# Patient Record
Sex: MALE | Race: WHITE | Employment: FULL TIME | URBAN - METROPOLITAN AREA
[De-identification: names, ages, dates, MRNs, and addresses within clinical notes are randomized per-mention and may not be internally consistent; named-entity substitution may affect disease eponyms.]

---

## 2018-01-16 ENCOUNTER — APPOINTMENT (OUTPATIENT)
Dept: GENERAL RADIOLOGY | Age: 68
End: 2018-01-16
Attending: INTERNAL MEDICINE
Payer: COMMERCIAL

## 2018-01-16 ENCOUNTER — HOSPITAL ENCOUNTER (EMERGENCY)
Age: 68
Discharge: HOME | End: 2018-01-16
Attending: INTERNAL MEDICINE
Payer: COMMERCIAL

## 2018-01-16 VITALS
HEART RATE: 67 BPM | DIASTOLIC BLOOD PRESSURE: 69 MMHG | WEIGHT: 195 LBS | OXYGEN SATURATION: 98 % | BODY MASS INDEX: 27.92 KG/M2 | TEMPERATURE: 97.9 F | HEIGHT: 70 IN | RESPIRATION RATE: 14 BRPM | SYSTOLIC BLOOD PRESSURE: 129 MMHG

## 2018-01-16 DIAGNOSIS — R07.9 EXERTIONAL CHEST PAIN: Primary | ICD-10-CM

## 2018-01-16 LAB
ALBUMIN SERPL-MCNC: 4.6 G/DL (ref 3.4–5)
ALBUMIN/GLOB SERPL: 1.5 {RATIO} (ref 0.8–1.7)
ALP SERPL-CCNC: 97 U/L (ref 45–117)
ALT SERPL-CCNC: 30 U/L (ref 16–61)
ANION GAP SERPL CALC-SCNC: 10 MMOL/L (ref 3–18)
APPEARANCE UR: ABNORMAL
APTT PPP: 27.5 SEC (ref 23–36.4)
AST SERPL-CCNC: 23 U/L (ref 15–37)
ATRIAL RATE: 83 BPM
BASOPHILS # BLD: 0.1 K/UL (ref 0–0.06)
BASOPHILS NFR BLD: 1 % (ref 0–2)
BILIRUB SERPL-MCNC: 0.4 MG/DL (ref 0.2–1)
BILIRUB UR QL: NEGATIVE
BUN SERPL-MCNC: 16 MG/DL (ref 7–18)
BUN/CREAT SERPL: 13 (ref 12–20)
CALCIUM SERPL-MCNC: 9.1 MG/DL (ref 8.5–10.1)
CALCULATED P AXIS, ECG09: 43 DEGREES
CALCULATED R AXIS, ECG10: -9 DEGREES
CALCULATED T AXIS, ECG11: 60 DEGREES
CHLORIDE SERPL-SCNC: 107 MMOL/L (ref 100–108)
CK MB CFR SERPL CALC: 0.7 % (ref 0–4)
CK MB CFR SERPL CALC: 0.8 % (ref 0–4)
CK MB SERPL-MCNC: 1 NG/ML (ref 5–25)
CK MB SERPL-MCNC: 1.2 NG/ML (ref 5–25)
CK SERPL-CCNC: 130 U/L (ref 39–308)
CK SERPL-CCNC: 167 U/L (ref 39–308)
CO2 SERPL-SCNC: 28 MMOL/L (ref 21–32)
COLOR UR: YELLOW
CREAT SERPL-MCNC: 1.27 MG/DL (ref 0.6–1.3)
DIAGNOSIS, 93000: NORMAL
DIFFERENTIAL METHOD BLD: ABNORMAL
EOSINOPHIL # BLD: 0.2 K/UL (ref 0–0.4)
EOSINOPHIL NFR BLD: 2 % (ref 0–5)
ERYTHROCYTE [DISTWIDTH] IN BLOOD BY AUTOMATED COUNT: 13.8 % (ref 11.6–14.5)
GLOBULIN SER CALC-MCNC: 3.1 G/DL (ref 2–4)
GLUCOSE SERPL-MCNC: 143 MG/DL (ref 74–99)
GLUCOSE UR STRIP.AUTO-MCNC: NEGATIVE MG/DL
HCT VFR BLD AUTO: 44.7 % (ref 36–48)
HGB BLD-MCNC: 14.9 G/DL (ref 13–16)
HGB UR QL STRIP: NEGATIVE
INR PPP: 0.9 (ref 0.8–1.2)
KETONES UR QL STRIP.AUTO: ABNORMAL MG/DL
LEUKOCYTE ESTERASE UR QL STRIP.AUTO: NEGATIVE
LYMPHOCYTES # BLD: 0.9 K/UL (ref 0.9–3.6)
LYMPHOCYTES NFR BLD: 9 % (ref 21–52)
MAGNESIUM SERPL-MCNC: 1.9 MG/DL (ref 1.6–2.6)
MCH RBC QN AUTO: 30.3 PG (ref 24–34)
MCHC RBC AUTO-ENTMCNC: 33.3 G/DL (ref 31–37)
MCV RBC AUTO: 91 FL (ref 74–97)
MONOCYTES # BLD: 0.8 K/UL (ref 0.05–1.2)
MONOCYTES NFR BLD: 8 % (ref 3–10)
NEUTS SEG # BLD: 8.2 K/UL (ref 1.8–8)
NEUTS SEG NFR BLD: 80 % (ref 40–73)
NITRITE UR QL STRIP.AUTO: NEGATIVE
P-R INTERVAL, ECG05: 170 MS
PH UR STRIP: 5.5 [PH] (ref 5–8)
PLATELET # BLD AUTO: 205 K/UL (ref 135–420)
PMV BLD AUTO: 9.4 FL (ref 9.2–11.8)
POTASSIUM SERPL-SCNC: 4.1 MMOL/L (ref 3.5–5.5)
PROT SERPL-MCNC: 7.7 G/DL (ref 6.4–8.2)
PROT UR STRIP-MCNC: NEGATIVE MG/DL
PROTHROMBIN TIME: 11.2 SEC (ref 11.5–15.2)
Q-T INTERVAL, ECG07: 358 MS
QRS DURATION, ECG06: 90 MS
QTC CALCULATION (BEZET), ECG08: 420 MS
RBC # BLD AUTO: 4.91 M/UL (ref 4.7–5.5)
SODIUM SERPL-SCNC: 145 MMOL/L (ref 136–145)
SP GR UR REFRACTOMETRY: 1.03 (ref 1–1.03)
TROPONIN I BLD-MCNC: <0.04 NG/ML (ref 0–0.08)
TROPONIN I SERPL-MCNC: <0.02 NG/ML (ref 0–0.06)
TROPONIN I SERPL-MCNC: <0.02 NG/ML (ref 0–0.06)
UROBILINOGEN UR QL STRIP.AUTO: 1 EU/DL (ref 0.2–1)
VENTRICULAR RATE, ECG03: 83 BPM
WBC # BLD AUTO: 10.2 K/UL (ref 4.6–13.2)

## 2018-01-16 PROCEDURE — 85025 COMPLETE CBC W/AUTO DIFF WBC: CPT | Performed by: INTERNAL MEDICINE

## 2018-01-16 PROCEDURE — 84484 ASSAY OF TROPONIN QUANT: CPT | Performed by: INTERNAL MEDICINE

## 2018-01-16 PROCEDURE — 83735 ASSAY OF MAGNESIUM: CPT | Performed by: INTERNAL MEDICINE

## 2018-01-16 PROCEDURE — 71045 X-RAY EXAM CHEST 1 VIEW: CPT

## 2018-01-16 PROCEDURE — 74011250637 HC RX REV CODE- 250/637: Performed by: INTERNAL MEDICINE

## 2018-01-16 PROCEDURE — 99285 EMERGENCY DEPT VISIT HI MDM: CPT

## 2018-01-16 PROCEDURE — 85610 PROTHROMBIN TIME: CPT | Performed by: INTERNAL MEDICINE

## 2018-01-16 PROCEDURE — 93005 ELECTROCARDIOGRAM TRACING: CPT

## 2018-01-16 PROCEDURE — 81003 URINALYSIS AUTO W/O SCOPE: CPT | Performed by: INTERNAL MEDICINE

## 2018-01-16 PROCEDURE — 85730 THROMBOPLASTIN TIME PARTIAL: CPT | Performed by: INTERNAL MEDICINE

## 2018-01-16 PROCEDURE — 82550 ASSAY OF CK (CPK): CPT | Performed by: INTERNAL MEDICINE

## 2018-01-16 PROCEDURE — 80053 COMPREHEN METABOLIC PANEL: CPT | Performed by: INTERNAL MEDICINE

## 2018-01-16 RX ORDER — FAMOTIDINE 20 MG/1
20 TABLET, FILM COATED ORAL 2 TIMES DAILY
Qty: 20 TAB | Refills: 0 | Status: SHIPPED | OUTPATIENT
Start: 2018-01-16 | End: 2018-01-26

## 2018-01-16 RX ORDER — NITROGLYCERIN 0.4 MG/1
0.4 TABLET SUBLINGUAL
Status: COMPLETED | OUTPATIENT
Start: 2018-01-16 | End: 2018-01-16

## 2018-01-16 RX ORDER — GUAIFENESIN 100 MG/5ML
324 LIQUID (ML) ORAL
Status: COMPLETED | OUTPATIENT
Start: 2018-01-16 | End: 2018-01-16

## 2018-01-16 RX ORDER — GUAIFENESIN 100 MG/5ML
81 LIQUID (ML) ORAL DAILY
Qty: 15 TAB | Refills: 0 | Status: SHIPPED | OUTPATIENT
Start: 2018-01-16 | End: 2019-11-26

## 2018-01-16 RX ADMIN — ASPIRIN 81 MG 324 MG: 81 TABLET ORAL at 20:38

## 2018-01-16 RX ADMIN — NITROGLYCERIN 0.4 MG: 0.4 TABLET SUBLINGUAL at 20:33

## 2018-01-17 LAB
ATRIAL RATE: 71 BPM
CALCULATED P AXIS, ECG09: 42 DEGREES
CALCULATED R AXIS, ECG10: -9 DEGREES
CALCULATED T AXIS, ECG11: 44 DEGREES
DIAGNOSIS, 93000: NORMAL
P-R INTERVAL, ECG05: 174 MS
Q-T INTERVAL, ECG07: 384 MS
QRS DURATION, ECG06: 96 MS
QTC CALCULATION (BEZET), ECG08: 417 MS
VENTRICULAR RATE, ECG03: 71 BPM

## 2018-01-17 NOTE — ED PROVIDER NOTES
EMERGENCY DEPARTMENT HISTORY AND PHYSICAL EXAM    Date: 1/16/2018  Patient Name: Fredy Reynoso    History of Presenting Illness     Chief Complaint   Patient presents with    Chest Pain         History Provided By: Patient    Chief Complaint: Chest pain  Duration: 1 hour  Timing:  Acute  Location: Bilateral  Quality: Radiating  Severity: 3 out of 10  Modifying Factors: Worsened with exertion, improves when sitting still. Associated Symptoms: lightheadedness, right neck pain, and nausea (resolved)    Additional History (Context):   8:09 PM  Fredy Reynoso is a 74 y.o. male with PMHX BPH, low testosterone, and asthma who presents by wheelchair to the emergency department C/O acute onset chest pain that originated on the RU chest and radiated across the entire chest (rated 7/10 at worst, 3/10 now), onset 1 hour ago. Worsened with exertion, improves when sitting still. Associated sxs include lightheadedness, right neck pain, and nausea (resolved). Pt reports that pain began after playing racquetball. Pt states that he had a stress test, cath, and MRI 3-4 years ago for an irregular heart rate. Denies any hx of blood clots. Pt denies LOC, ASA use, bleeding, V/D/C, fever, chills, cough, congestion, injury fall, numbness/tingling, weakness, travel, swelling, or any other sxs or complaints. PCP: Leonela Ro MD    Current Outpatient Prescriptions   Medication Sig Dispense Refill    aspirin 81 mg chewable tablet Take 1 Tab by mouth daily. 15 Tab 0    famotidine (PEPCID) 20 mg tablet Take 1 Tab by mouth two (2) times a day for 10 days. 20 Tab 0    Dutasteride-Tamsulosin (JENNY) 0.5-0.4 mg CM24 Take  by mouth daily.          Past History     Past Medical History:  Past Medical History:   Diagnosis Date    Asthma     BPH (benign prostatic hypertrophy)     Cataract     Other ill-defined conditions(289.25)     low testerone       Past Surgical History:  Past Surgical History:   Procedure Laterality Date    HX CATARACT REMOVAL      HX HEENT      sinus    HX MOHS PROCEDURES  1/8/14    right shoulder    HX ORTHOPAEDIC      finger       Family History:  History reviewed. No pertinent family history. Social History:  Social History   Substance Use Topics    Smoking status: Never Smoker    Smokeless tobacco: None    Alcohol use No       Allergies:  No Known Allergies      Review of Systems   Review of Systems   Constitutional: Negative for chills and fever. HENT: Negative for congestion. Respiratory: Negative for cough and shortness of breath. Cardiovascular: Positive for chest pain. Gastrointestinal: Positive for nausea (resolved). Negative for diarrhea and vomiting. Musculoskeletal: Positive for neck pain (right). Negative for back pain. Neurological: Positive for light-headedness. Negative for weakness and numbness. All other systems reviewed and are negative. Physical Exam     Vitals:    01/16/18 2035 01/16/18 2155 01/16/18 2200 01/16/18 2205   BP: 128/67 120/71 114/65 129/69   Pulse: 81 71 68 67   Resp: 13 16 14 14   Temp:       SpO2: 98% 99% 98% 98%   Weight:       Height:         Physical Exam   Constitutional: He is oriented to person, place, and time. He appears well-developed and well-nourished. HENT:   Head: Normocephalic and atraumatic. Right Ear: External ear normal.   Left Ear: External ear normal.   Nose: Nose normal.   Mouth/Throat: Oropharynx is clear and moist.   Eyes: Conjunctivae and EOM are normal. Pupils are equal, round, and reactive to light. Neck: Normal range of motion. Neck supple. No JVD present. No tracheal deviation present. No thyromegaly present. Cardiovascular: Normal rate, regular rhythm, normal heart sounds and intact distal pulses. Pulmonary/Chest: Effort normal and breath sounds normal.   Abdominal: Soft. Bowel sounds are normal. He exhibits no distension and no mass. There is no tenderness. No HSM   Musculoskeletal: Normal range of motion.  He exhibits no edema or tenderness. Lymphadenopathy:     He has no cervical adenopathy. Neurological: He is alert and oriented to person, place, and time. He has normal reflexes. No cranial nerve deficit. He exhibits normal muscle tone. Coordination normal.   No focal weakness   Skin: Skin is warm and dry. Psychiatric: He has a normal mood and affect. His behavior is normal. Thought content normal.   Nursing note and vitals reviewed. Diagnostic Study Results     Labs -     Recent Results (from the past 12 hour(s))   EKG, 12 LEAD, INITIAL    Collection Time: 01/16/18  8:03 PM   Result Value Ref Range    Ventricular Rate 83 BPM    Atrial Rate 83 BPM    P-R Interval 170 ms    QRS Duration 90 ms    Q-T Interval 358 ms    QTC Calculation (Bezet) 420 ms    Calculated P Axis 43 degrees    Calculated R Axis -9 degrees    Calculated T Axis 60 degrees    Diagnosis       Normal sinus rhythm  Anterior infarct , age undetermined  Left ventricular hypertrophy  Abnormal ECG    Confirmed by Ryan Marcos MD, Shaniqua Valderrama ([id number]) on 1/16/2018 10:15:11 PM     CBC WITH AUTOMATED DIFF    Collection Time: 01/16/18  8:12 PM   Result Value Ref Range    WBC 10.2 4.6 - 13.2 K/uL    RBC 4.91 4.70 - 5.50 M/uL    HGB 14.9 13.0 - 16.0 g/dL    HCT 44.7 36.0 - 48.0 %    MCV 91.0 74.0 - 97.0 FL    MCH 30.3 24.0 - 34.0 PG    MCHC 33.3 31.0 - 37.0 g/dL    RDW 13.8 11.6 - 14.5 %    PLATELET 154 699 - 594 K/uL    MPV 9.4 9.2 - 11.8 FL    NEUTROPHILS 80 (H) 40 - 73 %    LYMPHOCYTES 9 (L) 21 - 52 %    MONOCYTES 8 3 - 10 %    EOSINOPHILS 2 0 - 5 %    BASOPHILS 1 0 - 2 %    ABS. NEUTROPHILS 8.2 (H) 1.8 - 8.0 K/UL    ABS. LYMPHOCYTES 0.9 0.9 - 3.6 K/UL    ABS. MONOCYTES 0.8 0.05 - 1.2 K/UL    ABS. EOSINOPHILS 0.2 0.0 - 0.4 K/UL    ABS.  BASOPHILS 0.1 (H) 0.0 - 0.06 K/UL    DF AUTOMATED     METABOLIC PANEL, COMPREHENSIVE    Collection Time: 01/16/18  8:12 PM   Result Value Ref Range    Sodium 145 136 - 145 mmol/L    Potassium 4.1 3.5 - 5.5 mmol/L    Chloride 107 100 - 108 mmol/L    CO2 28 21 - 32 mmol/L    Anion gap 10 3.0 - 18 mmol/L    Glucose 143 (H) 74 - 99 mg/dL    BUN 16 7.0 - 18 MG/DL    Creatinine 1.27 0.6 - 1.3 MG/DL    BUN/Creatinine ratio 13 12 - 20      GFR est AA >60 >60 ml/min/1.73m2    GFR est non-AA 57 (L) >60 ml/min/1.73m2    Calcium 9.1 8.5 - 10.1 MG/DL    Bilirubin, total 0.4 0.2 - 1.0 MG/DL    ALT (SGPT) 30 16 - 61 U/L    AST (SGOT) 23 15 - 37 U/L    Alk. phosphatase 97 45 - 117 U/L    Protein, total 7.7 6.4 - 8.2 g/dL    Albumin 4.6 3.4 - 5.0 g/dL    Globulin 3.1 2.0 - 4.0 g/dL    A-G Ratio 1.5 0.8 - 1.7     CARDIAC PANEL,(CK, CKMB & TROPONIN)    Collection Time: 01/16/18  8:12 PM   Result Value Ref Range     39 - 308 U/L    CK - MB 1.2 <3.6 ng/ml    CK-MB Index 0.7 0.0 - 4.0 %    Troponin-I, Qt. <0.02 0.00 - 0.06 NG/ML   MAGNESIUM    Collection Time: 01/16/18  8:12 PM   Result Value Ref Range    Magnesium 1.9 1.6 - 2.6 mg/dL   PTT    Collection Time: 01/16/18  8:12 PM   Result Value Ref Range    aPTT 27.5 23.0 - 36.4 SEC   PROTHROMBIN TIME + INR    Collection Time: 01/16/18  8:12 PM   Result Value Ref Range    Prothrombin time 11.2 (L) 11.5 - 15.2 sec    INR 0.9 0.8 - 1.2     POC TROPONIN-I    Collection Time: 01/16/18  8:19 PM   Result Value Ref Range    Troponin-I (POC) <0.04 0.00 - 0.08 ng/mL   CARDIAC PANEL,(CK, CKMB & TROPONIN)    Collection Time: 01/16/18 10:10 PM   Result Value Ref Range     39 - 308 U/L    CK - MB 1.0 <3.6 ng/ml    CK-MB Index 0.8 0.0 - 4.0 %    Troponin-I, Qt. <0.02 0.00 - 0.06 NG/ML       Radiologic Studies -    XR CHEST PORT    (Results Pending)     9:10 PM  RADIOLOGY FINDINGS  Chest X-ray shows NAP  Pending review by Radiologist  Recorded by Gogo Paul, ED Scribe, as dictated by Leonetta Mcardle, MD    CT Results  (Last 48 hours)    None        CXR Results  (Last 48 hours)    None            Medical Decision Making   I am the first provider for this patient.     I reviewed the vital signs, available nursing notes, past medical history, past surgical history, family history and social history. Vital Signs-Reviewed the patient's vital signs. Pulse Oximetry Analysis - 98% on RA     Cardiac Monitor:  Rate: 90 bpm  Rhythm: NSR    EKG interpretation: (Preliminary)  8:03 PM  NSR at 83 bpm. Anterior infarct. No STEMI. Similar to old EKG of 1/14/14. Mild peaked T waves. EKG read by Leonetta Mcardle, MD at 8:03 PM     EKG interpretation: (Preliminary)  9:57 PM   NSR at 71 bpm. No STEMI, Not changed from earlier today. EKG read by Leonetta Mcardle, MD at 10:07 PM    Records Reviewed: Nursing Notes    Provider Notes (Medical Decision Making):   Ddx: ACS, CHF, MI, PE, pneumonia, pneumothorax, AAA, dissection, esophageal spasms, GERD, anemia, uremia. Rule out: other cardiovascular, pulmonary, or GI pathology. Procedures:  Procedures    ED Course:   8:09 PM   Initial assessment performed. The patients presenting problems have been discussed, and they are in agreement with the care plan formulated and outlined with them. I have encouraged them to ask questions as they arise throughout their visit. 9:04 PM  Pt is pain free, has no other complaints at this time. Diagnosis and Disposition       DISCHARGE NOTE:  10:57 PM  Fausto Alaniz's  results have been reviewed with him. He has been counseled regarding his diagnosis, treatment, and plan. He verbally conveys understanding and agreement of the signs, symptoms, diagnosis, treatment and prognosis and additionally agrees to follow up as discussed. He also agrees with the care-plan and conveys that all of his questions have been answered. I have also provided discharge instructions for him that include: educational information regarding their diagnosis and treatment, and list of reasons why they would want to return to the ED prior to their follow-up appointment, should his condition change. He has been provided with education for proper emergency department utilization. CLINICAL IMPRESSION:    1. Exertional chest pain        PLAN:  1. D/C Home  2. Current Discharge Medication List      START taking these medications    Details   aspirin 81 mg chewable tablet Take 1 Tab by mouth daily. Qty: 15 Tab, Refills: 0      famotidine (PEPCID) 20 mg tablet Take 1 Tab by mouth two (2) times a day for 10 days. Qty: 20 Tab, Refills: 0           3. Follow-up Information     Follow up With Details Comments [address], [place] Schedule an appointment as soon as possible for a visit in 2 days for PCP follow up [facility] Dr Zen Sanders [phone]      Lillie Huffman MD Schedule an appointment as soon as possible for a visit in 2 days for cardiology follow up [address]  [phone]      Cardiology stress test Call  [phone]    [facility] EMERGENCY DEPT  If symptoms worsen [address] [place] [address] [phone]      to call for stress test next days. Avoid strenous activity till cleared by cardiologist.    _______________________________    Attestations: This note is prepared by Pam Carrera, acting as Scribe for Kanchan Bnada MD.    Kanchan Banda MD:  The scribe's documentation has been prepared under my direction and personally reviewed by me in its entirety.   I confirm that the note above accurately reflects all work, treatment, procedures, and medical decision making performed by me.  _______________________________

## 2018-01-17 NOTE — ED TRIAGE NOTES
Pt reports chest pain that began 45 min prior to arrival. States that pain began after playing racket ball and is pressure like in nature originating on the right upper chest and radiating across the entire chest. States that pain is worse with exertion and improves with sitting still, does endorse some SOB as well. Sepsis Screening completed    (  )Patient meets SIRS criteria. ( x )Patient does not meet SIRS criteria.       SIRS Criteria is achieved when two or more of the following are present   Temperature < 96.8°F (36°C) or > 100.9°F (38.3°C)   Heart Rate > 90 beats per minute   Respiratory Rate > 20 breaths per minute   WBC count > 12,000 or <4,000 or > 10% bands

## 2018-01-17 NOTE — DISCHARGE INSTRUCTIONS

## 2019-11-25 ENCOUNTER — HOSPITAL ENCOUNTER (OUTPATIENT)
Dept: LAB | Age: 69
Discharge: HOME | End: 2019-11-25

## 2019-11-25 ENCOUNTER — HOSPITAL ENCOUNTER (OUTPATIENT)
Dept: PREADMISSION TESTING | Age: 69
Discharge: HOME | End: 2019-11-25
Payer: COMMERCIAL

## 2019-11-25 DIAGNOSIS — Z01.818 PREOPERATIVE EXAMINATION, UNSPECIFIED: ICD-10-CM

## 2019-11-25 LAB
ATRIAL RATE: 71 BPM
CALCULATED P AXIS, ECG09: 45 DEGREES
CALCULATED R AXIS, ECG10: -14 DEGREES
CALCULATED T AXIS, ECG11: 70 DEGREES
DIAGNOSIS, 93000: NORMAL
P-R INTERVAL, ECG05: 162 MS
Q-T INTERVAL, ECG07: 392 MS
QRS DURATION, ECG06: 114 MS
QTC CALCULATION (BEZET), ECG08: 425 MS
SENTARA SPECIMEN COL,SENBCF: NORMAL
VENTRICULAR RATE, ECG03: 71 BPM

## 2019-11-25 PROCEDURE — 99001 SPECIMEN HANDLING PT-LAB: CPT

## 2019-11-25 PROCEDURE — 93005 ELECTROCARDIOGRAM TRACING: CPT

## 2019-12-11 NOTE — H&P
Urology [address] [place], [place] [address]  Tel: [phone]  Fax: [phone]      Patient: Breana Berg  YOB: 1950  Date: 12/09/2019 7:44 AM   Visit Type: Pre Op        This 81year old male presents for Elevated PSA, Hypogonadism and BPH. History of Present Illness:  1. Elevated PSA   The onset of symptoms was gradual. The patient''''s PSA has risen since last monitored. Severity level is described as moderate. Pertinent history includes age over 48 and BPH. Associated symptoms include nocturia  and urinary frequency. Pertinent negatives include slow stream and urinary urgency. Additional information: the patient is sexually active and Pt is using Jayln. PSA has increaed to 10.84. He has had 2 biopsies previously. 10/23/19  Pt with hx of rising PSA, MRI of prostate this reveals no evidence of target lesion. He will repeat labs 6 months  2. Hypogonadism   The symptoms began gradually, have been moderate and are unchanged. The patient is here today for a follow up visit. The patient states he does not have difficultly attaining an erection and has no difficulty maintaining an erection. Pertinent history does not include diabetes or neurologic disease. Reviewed today was a PSA taken on 05/05/2015 with findings of 4.6 ng/mL, (19.60%). He denies depression. Additional information: Pt not been using Testim for 2 month, insurance will not cover it since we do not have a record of low testosterone. His T level last year was over 500. No treatment needed. 3.  BPH   Onset was gradual. It occurs daily. The problem is improving. There were no identified risk factors. Associated symptoms include nocturia and urinary frequency. Pertinent negatives include chills, constipation, fever, slow stream and urgency. Additional information: Pt continues to use Jayln. This works well for him and he is not having any side effects. No rx needed today.       10/16/19 Pt noted to have a very large prostate on MRI 6.4 x 6.3 x 7.0. Despote using Milka Daina he cont to have alot of urinary issues  Treatment options reviewed and he wants to proceed w Greenlight laser of prostate  12/9/19  Pt cont to have BPH issues. He is scheduled for Greenlight laser of the prostate        PROBLEM LIST:     Problem Description Onset Date Chronic Clinical Status Notes   Raised prostate specific antigen 11/25/2008 Y     Benign prostatic hyperplasia 11/25/2008 Y     Testicular hypofunction 11/25/2008 Y           Medications (active prior to today)  Medication Name Sig Description Start Date Stop Date Refilled Rx Elsewhere   multivitamin  06/30/2014   Y   Roberta 0.5 mg-0.4 mg capsule, extended release TAKE 1 CAPSULE BY ORAL ROUTE EVERY DAY ;30 MINUTES AFTER THE SAME MEAL EACH DAY 09/09/2019 09/09/2019 N       Allergies  Ingredient Reaction (Severity) Medication Name Comment   NO KNOWN ALLERGIES                Review of Systems  System Neg/Pos Details   Constitutional Negative Chills and Fever. ENMT Negative Ear infections and Sore throat. Eyes Negative Blurred vision, Double vision and Eye pain. Respiratory Negative Asthma, Chronic cough, Dyspnea and Wheezing. Cardio Negative Chest pain. GI Negative Constipation, Decreased appetite, Diarrhea, Nausea and Vomiting.  Positive Nocturia, Urinary frequency.  Negative Slow stream and Urgency. Endocrine Negative Cold intolerance, Heat intolerance, Increased thirst and Weight loss. Neuro Negative Headache and Tremors. Psych Negative Anxiety and Depression. Integumentary Negative Itching skin and Rash. MS Negative Back pain and Joint pain. Hema/Lymph Negative Easy bleeding. Physical Exam  Exam Findings Details   Constitutional Normal Well developed. Neck Exam Normal Inspection - Normal.   Respiratory Normal Inspection - Normal.   Extremity Normal No edema. Neurological Normal Alert and oriented to person, place and time. Cranial nerves intact. No motor or sensory deficits. Psychiatric Normal Orientation - Oriented to time, place, person & situation. Appropriate mood and affect. Immunizations Entered by History    Date Immunization   4/30/2019 12:00:00 AM Shingrix   12/29/2018 12:00:00 AM Seasonal trivalent influenza vaccine, adjuvanted, preservative free   12/29/2018 12:00:00 AM Shingrix   1/18/2018 12:00:00 AM Influenza, injectable, [place]in [place] Canine Kidney, preservative free, quadrivalent   12/3/2016 12:00:00 AM Influenza, seasonal, injectable   12/5/2015 12:00:00 AM Influenza, seasonal, injectable     Assessment/Plan  # Detail Type Description    1. Assessment Elevated prostate specific antigen [PSA] (R97.20). Patient Plan Pt with a hx of elevated PSA he's undergone 2 bx's in the past.  His PSA again spiked so an MRI was performed which showed no target lesions. It did reveal a very enlarged prostate over 130g.         2. Assessment Enlarged prostate w/ LUTS (N40.1). Patient Plan Pt continues to use Roberta. We discussed tx options given the size of his prostate he's elected to undergo a Greenlight laser of the prostate. All risks including pain, infection, bleeding, retrograde ejaculation, need for possible repeat procedure due to the size of his prostate were explained. He understands and would like to proceed. 3. Assessment Poor urinary stream (R39.12). 4. Assessment Frequency of micturition (R35.0).                      Medications (added, continued, or stopped today)  Start Date Medication Directions PRN Status PRN Reason Instruction Stop Date   09/09/2019 Roberta 0.5 mg-0.4 mg capsule, extended release TAKE 1 CAPSULE BY ORAL ROUTE EVERY DAY ;30 MINUTES AFTER THE SAME MEAL EACH DAY N      06/30/2014 multivitamin  N      Active Patient Care Team Members    Name Contact Agency Type Support Role Relationship Active Date Inactive Date Specialty   Billee Edilberto   encounter provider    Urology   Annalisa Cleary Jaret Akers   Emergency Contact Spouse      Lm Benitesaudiemarcella   Patient provider PCP   Family Practice       Provider: Reuben Willett MD 12/09/2019 07:44 AM  Document generated by:  Farrah Retana 12/09/2019 07:50 AM        Electronically signed by Reuben Willett MD on 12/09/2019 08:24 AM

## 2019-12-12 ENCOUNTER — ANESTHESIA EVENT (OUTPATIENT)
Dept: SURGERY | Age: 69
End: 2019-12-12
Payer: COMMERCIAL

## 2019-12-12 ENCOUNTER — ANESTHESIA (OUTPATIENT)
Dept: SURGERY | Age: 69
End: 2019-12-12
Payer: COMMERCIAL

## 2019-12-12 ENCOUNTER — HOSPITAL ENCOUNTER (OUTPATIENT)
Age: 69
Setting detail: OUTPATIENT SURGERY
Discharge: HOME | End: 2019-12-12
Attending: UROLOGY | Admitting: UROLOGY
Payer: COMMERCIAL

## 2019-12-12 VITALS
HEIGHT: 68 IN | BODY MASS INDEX: 30.8 KG/M2 | DIASTOLIC BLOOD PRESSURE: 72 MMHG | WEIGHT: 203.25 LBS | HEART RATE: 72 BPM | OXYGEN SATURATION: 97 % | SYSTOLIC BLOOD PRESSURE: 134 MMHG | TEMPERATURE: 97.3 F | RESPIRATION RATE: 16 BRPM

## 2019-12-12 PROCEDURE — 77030034696 HC CATH URETH FOL 2W BARD -A: Performed by: UROLOGY

## 2019-12-12 PROCEDURE — 74011250636 HC RX REV CODE- 250/636: Performed by: NURSE ANESTHETIST, CERTIFIED REGISTERED

## 2019-12-12 PROCEDURE — 74011000250 HC RX REV CODE- 250: Performed by: NURSE ANESTHETIST, CERTIFIED REGISTERED

## 2019-12-12 PROCEDURE — 74011250637 HC RX REV CODE- 250/637: Performed by: ANESTHESIOLOGY

## 2019-12-12 PROCEDURE — 76060000033 HC ANESTHESIA 1 TO 1.5 HR: Performed by: UROLOGY

## 2019-12-12 PROCEDURE — 74011250637 HC RX REV CODE- 250/637: Performed by: UROLOGY

## 2019-12-12 PROCEDURE — 74011250636 HC RX REV CODE- 250/636: Performed by: UROLOGY

## 2019-12-12 PROCEDURE — 76010000161 HC OR TIME 1 TO 1.5 HR INTENSV-TIER 1: Performed by: UROLOGY

## 2019-12-12 PROCEDURE — 77030018836 HC SOL IRR NACL ICUM -A: Performed by: UROLOGY

## 2019-12-12 PROCEDURE — 77030018832 HC SOL IRR H20 ICUM -A: Performed by: UROLOGY

## 2019-12-12 PROCEDURE — 77030020782 HC GWN BAIR PAWS FLX 3M -B: Performed by: UROLOGY

## 2019-12-12 PROCEDURE — 76210000021 HC REC RM PH II 0.5 TO 1 HR: Performed by: UROLOGY

## 2019-12-12 PROCEDURE — 76210000016 HC OR PH I REC 1 TO 1.5 HR: Performed by: UROLOGY

## 2019-12-12 RX ORDER — SODIUM CHLORIDE 0.9 % (FLUSH) 0.9 %
5-40 SYRINGE (ML) INJECTION AS NEEDED
Status: DISCONTINUED | OUTPATIENT
Start: 2019-12-12 | End: 2019-12-12 | Stop reason: HOSPADM

## 2019-12-12 RX ORDER — HYDROMORPHONE HYDROCHLORIDE 2 MG/ML
0.2 INJECTION, SOLUTION INTRAMUSCULAR; INTRAVENOUS; SUBCUTANEOUS AS NEEDED
Status: DISCONTINUED | OUTPATIENT
Start: 2019-12-12 | End: 2019-12-12 | Stop reason: HOSPADM

## 2019-12-12 RX ORDER — FENTANYL CITRATE 50 UG/ML
INJECTION, SOLUTION INTRAMUSCULAR; INTRAVENOUS AS NEEDED
Status: DISCONTINUED | OUTPATIENT
Start: 2019-12-12 | End: 2019-12-12 | Stop reason: HOSPADM

## 2019-12-12 RX ORDER — INSULIN LISPRO 100 [IU]/ML
INJECTION, SOLUTION INTRAVENOUS; SUBCUTANEOUS ONCE
Status: DISCONTINUED | OUTPATIENT
Start: 2019-12-12 | End: 2019-12-12 | Stop reason: HOSPADM

## 2019-12-12 RX ORDER — LIDOCAINE HYDROCHLORIDE 20 MG/ML
INJECTION, SOLUTION EPIDURAL; INFILTRATION; INTRACAUDAL; PERINEURAL AS NEEDED
Status: DISCONTINUED | OUTPATIENT
Start: 2019-12-12 | End: 2019-12-12 | Stop reason: HOSPADM

## 2019-12-12 RX ORDER — SODIUM CHLORIDE 0.9 % (FLUSH) 0.9 %
5-40 SYRINGE (ML) INJECTION EVERY 8 HOURS
Status: DISCONTINUED | OUTPATIENT
Start: 2019-12-12 | End: 2019-12-12 | Stop reason: HOSPADM

## 2019-12-12 RX ORDER — ATROPA BELLADONNA AND OPIUM 16.2; 3 MG/1; MG/1
SUPPOSITORY RECTAL AS NEEDED
Status: DISCONTINUED | OUTPATIENT
Start: 2019-12-12 | End: 2019-12-12 | Stop reason: HOSPADM

## 2019-12-12 RX ORDER — ONDANSETRON 2 MG/ML
INJECTION INTRAMUSCULAR; INTRAVENOUS AS NEEDED
Status: DISCONTINUED | OUTPATIENT
Start: 2019-12-12 | End: 2019-12-12 | Stop reason: HOSPADM

## 2019-12-12 RX ORDER — GLYCOPYRROLATE 0.2 MG/ML
INJECTION INTRAMUSCULAR; INTRAVENOUS AS NEEDED
Status: DISCONTINUED | OUTPATIENT
Start: 2019-12-12 | End: 2019-12-12 | Stop reason: HOSPADM

## 2019-12-12 RX ORDER — FUROSEMIDE 10 MG/ML
INJECTION INTRAMUSCULAR; INTRAVENOUS AS NEEDED
Status: DISCONTINUED | OUTPATIENT
Start: 2019-12-12 | End: 2019-12-12 | Stop reason: HOSPADM

## 2019-12-12 RX ORDER — DEXTROSE MONOHYDRATE 100 MG/ML
125-250 INJECTION, SOLUTION INTRAVENOUS AS NEEDED
Status: DISCONTINUED | OUTPATIENT
Start: 2019-12-12 | End: 2019-12-12 | Stop reason: HOSPADM

## 2019-12-12 RX ORDER — MIDAZOLAM HYDROCHLORIDE 1 MG/ML
INJECTION, SOLUTION INTRAMUSCULAR; INTRAVENOUS AS NEEDED
Status: DISCONTINUED | OUTPATIENT
Start: 2019-12-12 | End: 2019-12-12 | Stop reason: HOSPADM

## 2019-12-12 RX ORDER — MAGNESIUM SULFATE 100 %
4 CRYSTALS MISCELLANEOUS AS NEEDED
Status: DISCONTINUED | OUTPATIENT
Start: 2019-12-12 | End: 2019-12-12 | Stop reason: HOSPADM

## 2019-12-12 RX ORDER — HYDROCODONE BITARTRATE AND ACETAMINOPHEN 5; 325 MG/1; MG/1
1 TABLET ORAL AS NEEDED
Status: DISCONTINUED | OUTPATIENT
Start: 2019-12-12 | End: 2019-12-12 | Stop reason: HOSPADM

## 2019-12-12 RX ORDER — LEVOFLOXACIN 5 MG/ML
500 INJECTION, SOLUTION INTRAVENOUS ONCE
Status: COMPLETED | OUTPATIENT
Start: 2019-12-12 | End: 2019-12-12

## 2019-12-12 RX ORDER — SODIUM CHLORIDE, SODIUM LACTATE, POTASSIUM CHLORIDE, CALCIUM CHLORIDE 600; 310; 30; 20 MG/100ML; MG/100ML; MG/100ML; MG/100ML
150 INJECTION, SOLUTION INTRAVENOUS CONTINUOUS
Status: DISCONTINUED | OUTPATIENT
Start: 2019-12-12 | End: 2019-12-12 | Stop reason: HOSPADM

## 2019-12-12 RX ORDER — DEXAMETHASONE SODIUM PHOSPHATE 4 MG/ML
INJECTION, SOLUTION INTRA-ARTICULAR; INTRALESIONAL; INTRAMUSCULAR; INTRAVENOUS; SOFT TISSUE AS NEEDED
Status: DISCONTINUED | OUTPATIENT
Start: 2019-12-12 | End: 2019-12-12 | Stop reason: HOSPADM

## 2019-12-12 RX ORDER — ALBUTEROL SULFATE 0.83 MG/ML
2.5 SOLUTION RESPIRATORY (INHALATION) AS NEEDED
Status: DISCONTINUED | OUTPATIENT
Start: 2019-12-12 | End: 2019-12-12 | Stop reason: HOSPADM

## 2019-12-12 RX ORDER — DIPHENHYDRAMINE HYDROCHLORIDE 50 MG/ML
12.5 INJECTION, SOLUTION INTRAMUSCULAR; INTRAVENOUS
Status: DISCONTINUED | OUTPATIENT
Start: 2019-12-12 | End: 2019-12-12 | Stop reason: HOSPADM

## 2019-12-12 RX ORDER — PROPOFOL 10 MG/ML
INJECTION, EMULSION INTRAVENOUS AS NEEDED
Status: DISCONTINUED | OUTPATIENT
Start: 2019-12-12 | End: 2019-12-12 | Stop reason: HOSPADM

## 2019-12-12 RX ORDER — SODIUM CHLORIDE, SODIUM LACTATE, POTASSIUM CHLORIDE, CALCIUM CHLORIDE 600; 310; 30; 20 MG/100ML; MG/100ML; MG/100ML; MG/100ML
125 INJECTION, SOLUTION INTRAVENOUS CONTINUOUS
Status: DISCONTINUED | OUTPATIENT
Start: 2019-12-12 | End: 2019-12-12 | Stop reason: HOSPADM

## 2019-12-12 RX ORDER — ONDANSETRON 2 MG/ML
4 INJECTION INTRAMUSCULAR; INTRAVENOUS ONCE
Status: DISCONTINUED | OUTPATIENT
Start: 2019-12-12 | End: 2019-12-12 | Stop reason: HOSPADM

## 2019-12-12 RX ORDER — NALOXONE HYDROCHLORIDE 0.4 MG/ML
0.1 INJECTION, SOLUTION INTRAMUSCULAR; INTRAVENOUS; SUBCUTANEOUS AS NEEDED
Status: DISCONTINUED | OUTPATIENT
Start: 2019-12-12 | End: 2019-12-12 | Stop reason: HOSPADM

## 2019-12-12 RX ORDER — FENTANYL CITRATE 50 UG/ML
25 INJECTION, SOLUTION INTRAMUSCULAR; INTRAVENOUS
Status: DISCONTINUED | OUTPATIENT
Start: 2019-12-12 | End: 2019-12-12 | Stop reason: HOSPADM

## 2019-12-12 RX ADMIN — FENTANYL CITRATE 25 MCG: 50 INJECTION, SOLUTION INTRAMUSCULAR; INTRAVENOUS at 12:32

## 2019-12-12 RX ADMIN — LIDOCAINE HYDROCHLORIDE 40 MG: 20 INJECTION, SOLUTION EPIDURAL; INFILTRATION; INTRACAUDAL; PERINEURAL at 12:27

## 2019-12-12 RX ADMIN — DEXAMETHASONE SODIUM PHOSPHATE 4 MG: 4 INJECTION, SOLUTION INTRAMUSCULAR; INTRAVENOUS at 12:34

## 2019-12-12 RX ADMIN — FENTANYL CITRATE 50 MCG: 50 INJECTION, SOLUTION INTRAMUSCULAR; INTRAVENOUS at 13:33

## 2019-12-12 RX ADMIN — SODIUM CHLORIDE, SODIUM LACTATE, POTASSIUM CHLORIDE, AND CALCIUM CHLORIDE 125 ML/HR: 600; 310; 30; 20 INJECTION, SOLUTION INTRAVENOUS at 11:43

## 2019-12-12 RX ADMIN — ONDANSETRON HYDROCHLORIDE 4 MG: 2 INJECTION INTRAMUSCULAR; INTRAVENOUS at 12:34

## 2019-12-12 RX ADMIN — LEVOFLOXACIN 500 MG: 5 INJECTION, SOLUTION INTRAVENOUS at 12:31

## 2019-12-12 RX ADMIN — FENTANYL CITRATE 25 MCG: 50 INJECTION, SOLUTION INTRAMUSCULAR; INTRAVENOUS at 13:01

## 2019-12-12 RX ADMIN — FENTANYL CITRATE 25 MCG: 50 INJECTION, SOLUTION INTRAMUSCULAR; INTRAVENOUS at 12:45

## 2019-12-12 RX ADMIN — FENTANYL CITRATE 50 MCG: 50 INJECTION, SOLUTION INTRAMUSCULAR; INTRAVENOUS at 13:18

## 2019-12-12 RX ADMIN — FUROSEMIDE 10 MG: 10 INJECTION, SOLUTION INTRAVENOUS at 13:20

## 2019-12-12 RX ADMIN — GLYCOPYRROLATE 0.1 MG: 0.2 INJECTION INTRAMUSCULAR; INTRAVENOUS at 12:50

## 2019-12-12 RX ADMIN — HYDROCODONE BITARTRATE AND ACETAMINOPHEN 1 TABLET: 5; 325 TABLET ORAL at 15:10

## 2019-12-12 RX ADMIN — PROPOFOL 200 MG: 10 INJECTION, EMULSION INTRAVENOUS at 12:27

## 2019-12-12 RX ADMIN — MIDAZOLAM 2 MG: 1 INJECTION INTRAMUSCULAR; INTRAVENOUS at 12:21

## 2019-12-12 RX ADMIN — SODIUM CHLORIDE, SODIUM LACTATE, POTASSIUM CHLORIDE, AND CALCIUM CHLORIDE: 600; 310; 30; 20 INJECTION, SOLUTION INTRAVENOUS at 13:05

## 2019-12-12 RX ADMIN — GLYCOPYRROLATE 0.1 MG: 0.2 INJECTION INTRAMUSCULAR; INTRAVENOUS at 12:51

## 2019-12-12 RX ADMIN — FENTANYL CITRATE 25 MCG: 50 INJECTION, SOLUTION INTRAMUSCULAR; INTRAVENOUS at 12:38

## 2019-12-12 NOTE — DISCHARGE INSTRUCTIONS
Fahad Iraheta. Patricia Durant M.D. [facility]  [address], [address] [place], [address]  Office: [phone]  Fax:    [phone]: Procedure(s):  [address] Urology IMMEDIATELY if any of the following occur:     You are unable to urinate. Urgency to urinate is not uncommon.  You find yourself urinating small frequent amounts associated with severe lower abdominal discomfort.  Bright red blood with clots in the urine. Some reddish urine is not uncommon and should be treated with increasing the amount of fluids you drink.  Temperature above 101.5° and / or chills.  You are nauseous and / or vomiting and you cannot hold down any fluids.  Your pain is not controlled with the pain medication prescribed. Special Considerations:      Do not drive for at least 24 hours after the procedure and until you are no longer taking narcotic pain medication and you are able to move and react without hesitation. MEDICATIONS:  Pain   []  Norco®   []  Percocet® []  Dilaudid®    []  Tramadol   Antibiotics   []  Cipro   []  Keflex    [] Levaquin   []  Bactrim DS®       Urination   []  Vesicare®   []  Flomax     Burning   []  Pyridium®   []  UribelTM     Nausea   []  Zofran®   []  Phenergan®     Miscellaneous   []           [] Prescriptions Written on Chart    [] Prescriptions sent Electronically           Our office will call you tomorrow to schedule your first follow-up appointment. Please contact [place] [address] [facility] at [phone] or go to the nearest Emergency Department / Urgent Care facility for any other medical questions or concerns.       DISCHARGE SUMMARY from Nurse    PATIENT INSTRUCTIONS:    After general anesthesia or intravenous sedation, for 24 hours or while taking prescription Narcotics:  · Limit your activities  · Do not drive and operate hazardous machinery  · Do not make important personal or business decisions  · Do  not drink alcoholic beverages  · If you have not urinated within 8 hours after discharge, please contact your surgeon on call. Report the following to your surgeon:  · Excessive pain, swelling, redness or odor of or around the surgical area  · Temperature over 100.5  · Nausea and vomiting lasting longer than 4 hours or if unable to take medications  · Any signs of decreased circulation or nerve impairment to extremity: change in color, persistent  numbness, tingling, coldness or increase pain  · Any questions    What to do at Home:  Recommended activity: Ambulate in house and No driving while on analgesics. If you experience any of the following symptoms listed above, please follow up with Dr. Qian Barrera. *  Please give a list of your current medications to your Primary Care Provider. *  Please update this list whenever your medications are discontinued, doses are      changed, or new medications (including over-the-counter products) are added. *  Please carry medication information at all times in case of emergency situations. These are general instructions for a healthy lifestyle:    No smoking/ No tobacco products/ Avoid exposure to second hand smoke  Surgeon General's Warning:  Quitting smoking now greatly reduces serious risk to your health. Obesity, smoking, and sedentary lifestyle greatly increases your risk for illness    A healthy diet, regular physical exercise & weight monitoring are important for maintaining a healthy lifestyle    You may be retaining fluid if you have a history of heart failure or if you experience any of the following symptoms:  Weight gain of 3 pounds or more overnight or 5 pounds in a week, increased swelling in our hands or feet or shortness of breath while lying flat in bed. Please call your doctor as soon as you notice any of these symptoms; do not wait until your next office visit. The discharge information has been reviewed with the patient and caregiver.   The patient and caregiver verbalized understanding. Discharge medications reviewed with the patient and caregiver and appropriate educational materials and side effects teaching were provided. ___________________________________________________________________________________________________________________________________    Patient armband removed and shredded.

## 2019-12-12 NOTE — ANESTHESIA PREPROCEDURE EVALUATION
Relevant Problems   No relevant active problems       Anesthetic History   No history of anesthetic complications            Review of Systems / Medical History  Patient summary reviewed, nursing notes reviewed and pertinent labs reviewed    Pulmonary            Asthma : well controlled       Neuro/Psych   Within defined limits           Cardiovascular                  Exercise tolerance: >4 METS     GI/Hepatic/Renal  Within defined limits              Endo/Other        Arthritis     Other Findings            Physical Exam    Airway  Mallampati: II  TM Distance: 4 - 6 cm  Neck ROM: normal range of motion   Mouth opening: Normal     Cardiovascular  Regular rate and rhythm,  S1 and S2 normal,  no murmur, click, rub, or gallop             Dental  No notable dental hx       Pulmonary  Breath sounds clear to auscultation               Abdominal  GI exam deferred       Other Findings            Anesthetic Plan    ASA: 2  Anesthesia type: general          Induction: Intravenous  Anesthetic plan and risks discussed with: Patient

## 2019-12-12 NOTE — OP NOTES
BRIEF OPERATIVE NOTE    Date of Procedure: 12/12/2019   Preoperative Diagnosis: BPH WITH OBSTRUCTION  Postoperative Diagnosis: BPH WITH OBSTRUCTION    Procedure(s):  GREENLIGHT LASER OF PROSTATE  Surgeon(s) and Role:     Anselmo Mcfarland MD - Primary         Surgical Assistant: [id number],[place] [address]    Surgical Staff:  Circ-1: Agnieszka Archer RN  Scrub Tech-1: Magalie Lim  Float Staff: Moni Russ  Event Time In Time Out   Incision Start 1240    Incision Close 1326      Anesthesia: General   Estimated Blood Loss: minimal  Specimens: * No specimens in log *   Findings: enlarged lateral llobes   Complications: none  Implants: * No implants in log *      Procedure Details:    After informed consent was obtained, the patient was taken to the operating room, and he underwent laryngeal mask anesthesia in the supine position. He was then prepped and draped in the usual surgical fashion after being placed in the dorsal lithotomy position. A 23-Hungarian laser cystoscope was inserted with visual obturator per urethra into the bladder. The ureteral orifices were identified. The verumontanum was identified. Next, the visual obturator was exchanged for a laser guide. The greenlight laser fiber was inserted through the laser guide. Lasering was begun at the bladder neck from 5 o'clock to the 7 o'clock position. Once the bladder neck was taken down, my attention then turned towards the right lobe from the bladder neck towards the verumontanum. The left lobe was taken down in a similar fashion. The remainder of the excess prostatic adenoma was removed with the laser. The bladder was redrained, refilled at low-volume. There was no active bleeding. 10 ml of Lasix was given IV to promote post op diuresis. The procedure ended at this point. [id number] joules were used. Both ureteral orifices and the verumontanum were intact at the end of the procedure. The bladder was filled.  The laser was placed on standby and the cystoscope was removed. A 22-Bahraini, 5 mL two-way Morejon catheter was inserted per urethra into the bladder draining clear urine. Ten mL of sterile water was placed in the balloon and the catheter was connected to a leg bag. The patient was then washed off, dried, and placed in the supine position. He was awakened from anesthesia and then transferred to the post anaesthesia care unit.            Betsy Bailey MD  12/12/2019  1:28 PM

## 2019-12-12 NOTE — PERIOP NOTES
TRANSFER - IN REPORT:    Verbal report received from  [address] [place] [address], OR nurse (name) on Omar Chung  being received from OR (unit) for routine progression of care      Report consisted of patients Situation, Background, Assessment and   Recommendations(SBAR). Information from the following report(s) OR Summary, Procedure Summary, Intake/Output and MAR was reviewed with the receiving nurse. Opportunity for questions and clarification was provided. Assessment completed upon patients arrival to unit and care assumed.

## 2019-12-12 NOTE — PERIOP NOTES
I have reviewed the provider's instructions with the patient, and spouse answering all questions to his satisfaction. Verbalized understanding of discharge and follow up. Aware to go to Dr. Litzy Pisano office tomorrow morning for catheter removal and voiding trial.  Patient has had previous dao catheters and aware of how to empty it at home.

## 2019-12-12 NOTE — INTERVAL H&P NOTE
H&P Update: 
Lavon Barba was seen and examined. History and physical has been reviewed. The patient has been examined.  There have been no significant clinical changes since the completion of the originally dated History and Physical.

## 2019-12-13 NOTE — ANESTHESIA POSTPROCEDURE EVALUATION
Procedure(s):  GREENLIGHT LASER OF PROSTATE.    general    Anesthesia Post Evaluation      Multimodal analgesia: multimodal analgesia used between 6 hours prior to anesthesia start to PACU discharge  Patient location during evaluation: PACU  Patient participation: complete - patient participated  Level of consciousness: awake  Pain management: adequate  Airway patency: patent  Anesthetic complications: no  Cardiovascular status: acceptable  Respiratory status: acceptable  Hydration status: acceptable  Post anesthesia nausea and vomiting:  none      Vitals Value Taken Time   /81 12/12/2019  2:30 PM   Temp 36.3 °C (97.3 °F) 12/12/2019  1:42 PM   Pulse 77 12/12/2019  2:43 PM   Resp 13 12/12/2019  2:43 PM   SpO2 96 % 12/12/2019  2:43 PM   Vitals shown include unvalidated device data.

## 2020-12-18 ENCOUNTER — HOSPITAL ENCOUNTER (OUTPATIENT)
Dept: PREADMISSION TESTING | Age: 70
Discharge: HOME | End: 2020-12-18
Payer: COMMERCIAL

## 2020-12-18 ENCOUNTER — HOSPITAL ENCOUNTER (OUTPATIENT)
Dept: NON INVASIVE DIAGNOSTICS | Age: 70
Discharge: HOME | End: 2020-12-18
Payer: COMMERCIAL

## 2020-12-18 PROCEDURE — 93005 ELECTROCARDIOGRAM TRACING: CPT

## 2020-12-18 PROCEDURE — 87635 SARS-COV-2 COVID-19 AMP PRB: CPT

## 2020-12-19 LAB
ATRIAL RATE: 57 BPM
CALCULATED P AXIS, ECG09: 56 DEGREES
CALCULATED R AXIS, ECG10: 56 DEGREES
CALCULATED T AXIS, ECG11: 30 DEGREES
DIAGNOSIS, 93000: NORMAL
P-R INTERVAL, ECG05: 172 MS
Q-T INTERVAL, ECG07: 450 MS
QRS DURATION, ECG06: 132 MS
QTC CALCULATION (BEZET), ECG08: 438 MS
SARS-COV-2, COV2NT: NOT DETECTED
VENTRICULAR RATE, ECG03: 57 BPM

## 2020-12-24 ENCOUNTER — HOSPITAL ENCOUNTER (OUTPATIENT)
Age: 70
Setting detail: OUTPATIENT SURGERY
Discharge: HOME | End: 2020-12-24
Attending: UROLOGY | Admitting: UROLOGY
Payer: COMMERCIAL

## 2020-12-24 ENCOUNTER — ANESTHESIA EVENT (OUTPATIENT)
Dept: SURGERY | Age: 70
End: 2020-12-24
Payer: COMMERCIAL

## 2020-12-24 ENCOUNTER — ANESTHESIA (OUTPATIENT)
Dept: SURGERY | Age: 70
End: 2020-12-24
Payer: COMMERCIAL

## 2020-12-24 VITALS
HEART RATE: 58 BPM | DIASTOLIC BLOOD PRESSURE: 68 MMHG | WEIGHT: 207.44 LBS | TEMPERATURE: 97.1 F | BODY MASS INDEX: 29.7 KG/M2 | HEIGHT: 70 IN | OXYGEN SATURATION: 97 % | RESPIRATION RATE: 19 BRPM | SYSTOLIC BLOOD PRESSURE: 125 MMHG

## 2020-12-24 PROCEDURE — 82360 CALCULUS ASSAY QUANT: CPT

## 2020-12-24 PROCEDURE — 74011250637 HC RX REV CODE- 250/637: Performed by: UROLOGY

## 2020-12-24 PROCEDURE — 74011250636 HC RX REV CODE- 250/636: Performed by: ANESTHESIOLOGY

## 2020-12-24 PROCEDURE — 76060000033 HC ANESTHESIA 1 TO 1.5 HR: Performed by: UROLOGY

## 2020-12-24 PROCEDURE — 74011250636 HC RX REV CODE- 250/636: Performed by: UROLOGY

## 2020-12-24 PROCEDURE — 76010000149 HC OR TIME 1 TO 1.5 HR: Performed by: UROLOGY

## 2020-12-24 PROCEDURE — 77030034696 HC CATH URETH FOL 2W BARD -A: Performed by: UROLOGY

## 2020-12-24 PROCEDURE — 88311 DECALCIFY TISSUE: CPT

## 2020-12-24 PROCEDURE — 77030020782 HC GWN BAIR PAWS FLX 3M -B: Performed by: UROLOGY

## 2020-12-24 PROCEDURE — 74011000250 HC RX REV CODE- 250: Performed by: ANESTHESIOLOGY

## 2020-12-24 PROCEDURE — 74011000272 HC RX REV CODE- 272: Performed by: UROLOGY

## 2020-12-24 PROCEDURE — 77030020268 HC MISC GENERAL SUPPLY: Performed by: UROLOGY

## 2020-12-24 PROCEDURE — 88305 TISSUE EXAM BY PATHOLOGIST: CPT

## 2020-12-24 PROCEDURE — 76210000021 HC REC RM PH II 0.5 TO 1 HR: Performed by: UROLOGY

## 2020-12-24 PROCEDURE — 2709999900 HC NON-CHARGEABLE SUPPLY: Performed by: UROLOGY

## 2020-12-24 PROCEDURE — 76210000006 HC OR PH I REC 0.5 TO 1 HR: Performed by: UROLOGY

## 2020-12-24 RX ORDER — LEVOFLOXACIN 5 MG/ML
500 INJECTION, SOLUTION INTRAVENOUS ONCE
Status: COMPLETED | OUTPATIENT
Start: 2020-12-24 | End: 2020-12-24

## 2020-12-24 RX ORDER — DEXAMETHASONE SODIUM PHOSPHATE 4 MG/ML
INJECTION, SOLUTION INTRA-ARTICULAR; INTRALESIONAL; INTRAMUSCULAR; INTRAVENOUS; SOFT TISSUE AS NEEDED
Status: DISCONTINUED | OUTPATIENT
Start: 2020-12-24 | End: 2020-12-24 | Stop reason: HOSPADM

## 2020-12-24 RX ORDER — LIDOCAINE HYDROCHLORIDE 20 MG/ML
INJECTION, SOLUTION EPIDURAL; INFILTRATION; INTRACAUDAL; PERINEURAL AS NEEDED
Status: DISCONTINUED | OUTPATIENT
Start: 2020-12-24 | End: 2020-12-24 | Stop reason: HOSPADM

## 2020-12-24 RX ORDER — OXYCODONE AND ACETAMINOPHEN 5; 325 MG/1; MG/1
1 TABLET ORAL
Status: DISCONTINUED | OUTPATIENT
Start: 2020-12-24 | End: 2020-12-24 | Stop reason: HOSPADM

## 2020-12-24 RX ORDER — SODIUM CHLORIDE, SODIUM LACTATE, POTASSIUM CHLORIDE, CALCIUM CHLORIDE 600; 310; 30; 20 MG/100ML; MG/100ML; MG/100ML; MG/100ML
125 INJECTION, SOLUTION INTRAVENOUS CONTINUOUS
Status: DISCONTINUED | OUTPATIENT
Start: 2020-12-24 | End: 2020-12-24 | Stop reason: HOSPADM

## 2020-12-24 RX ORDER — FENTANYL CITRATE 50 UG/ML
INJECTION, SOLUTION INTRAMUSCULAR; INTRAVENOUS AS NEEDED
Status: DISCONTINUED | OUTPATIENT
Start: 2020-12-24 | End: 2020-12-24 | Stop reason: HOSPADM

## 2020-12-24 RX ORDER — PROPOFOL 10 MG/ML
INJECTION, EMULSION INTRAVENOUS AS NEEDED
Status: DISCONTINUED | OUTPATIENT
Start: 2020-12-24 | End: 2020-12-24 | Stop reason: HOSPADM

## 2020-12-24 RX ORDER — SODIUM CHLORIDE, SODIUM LACTATE, POTASSIUM CHLORIDE, CALCIUM CHLORIDE 600; 310; 30; 20 MG/100ML; MG/100ML; MG/100ML; MG/100ML
100 INJECTION, SOLUTION INTRAVENOUS CONTINUOUS
Status: DISCONTINUED | OUTPATIENT
Start: 2020-12-24 | End: 2020-12-24 | Stop reason: HOSPADM

## 2020-12-24 RX ORDER — KETOROLAC TROMETHAMINE 15 MG/ML
INJECTION, SOLUTION INTRAMUSCULAR; INTRAVENOUS AS NEEDED
Status: DISCONTINUED | OUTPATIENT
Start: 2020-12-24 | End: 2020-12-24 | Stop reason: HOSPADM

## 2020-12-24 RX ORDER — FENTANYL CITRATE 50 UG/ML
50 INJECTION, SOLUTION INTRAMUSCULAR; INTRAVENOUS
Status: DISCONTINUED | OUTPATIENT
Start: 2020-12-24 | End: 2020-12-24 | Stop reason: HOSPADM

## 2020-12-24 RX ORDER — ONDANSETRON 2 MG/ML
4 INJECTION INTRAMUSCULAR; INTRAVENOUS ONCE
Status: DISCONTINUED | OUTPATIENT
Start: 2020-12-24 | End: 2020-12-24 | Stop reason: HOSPADM

## 2020-12-24 RX ORDER — FLUMAZENIL 0.1 MG/ML
0.2 INJECTION INTRAVENOUS
Status: DISCONTINUED | OUTPATIENT
Start: 2020-12-24 | End: 2020-12-24 | Stop reason: HOSPADM

## 2020-12-24 RX ORDER — MIDAZOLAM HYDROCHLORIDE 1 MG/ML
INJECTION, SOLUTION INTRAMUSCULAR; INTRAVENOUS AS NEEDED
Status: DISCONTINUED | OUTPATIENT
Start: 2020-12-24 | End: 2020-12-24 | Stop reason: HOSPADM

## 2020-12-24 RX ORDER — ONDANSETRON 2 MG/ML
INJECTION INTRAMUSCULAR; INTRAVENOUS AS NEEDED
Status: DISCONTINUED | OUTPATIENT
Start: 2020-12-24 | End: 2020-12-24 | Stop reason: HOSPADM

## 2020-12-24 RX ORDER — ATROPA BELLADONNA AND OPIUM 16.2; 3 MG/1; MG/1
SUPPOSITORY RECTAL AS NEEDED
Status: DISCONTINUED | OUTPATIENT
Start: 2020-12-24 | End: 2020-12-24 | Stop reason: HOSPADM

## 2020-12-24 RX ORDER — FUROSEMIDE 10 MG/ML
INJECTION INTRAMUSCULAR; INTRAVENOUS AS NEEDED
Status: DISCONTINUED | OUTPATIENT
Start: 2020-12-24 | End: 2020-12-24 | Stop reason: HOSPADM

## 2020-12-24 RX ORDER — NALOXONE HYDROCHLORIDE 0.4 MG/ML
0.4 INJECTION, SOLUTION INTRAMUSCULAR; INTRAVENOUS; SUBCUTANEOUS AS NEEDED
Status: DISCONTINUED | OUTPATIENT
Start: 2020-12-24 | End: 2020-12-24 | Stop reason: HOSPADM

## 2020-12-24 RX ORDER — HYDROMORPHONE HYDROCHLORIDE 1 MG/ML
0.5 INJECTION, SOLUTION INTRAMUSCULAR; INTRAVENOUS; SUBCUTANEOUS
Status: DISCONTINUED | OUTPATIENT
Start: 2020-12-24 | End: 2020-12-24 | Stop reason: HOSPADM

## 2020-12-24 RX ADMIN — PROPOFOL 200 MG: 10 INJECTION, EMULSION INTRAVENOUS at 08:51

## 2020-12-24 RX ADMIN — KETOROLAC TROMETHAMINE 15 MG: 15 INJECTION, SOLUTION INTRAMUSCULAR; INTRAVENOUS at 09:36

## 2020-12-24 RX ADMIN — FENTANYL CITRATE 25 MCG: 50 INJECTION, SOLUTION INTRAMUSCULAR; INTRAVENOUS at 09:28

## 2020-12-24 RX ADMIN — SODIUM CHLORIDE, SODIUM LACTATE, POTASSIUM CHLORIDE, AND CALCIUM CHLORIDE 125 ML/HR: 600; 310; 30; 20 INJECTION, SOLUTION INTRAVENOUS at 07:30

## 2020-12-24 RX ADMIN — ONDANSETRON HYDROCHLORIDE 4 MG: 2 INJECTION INTRAMUSCULAR; INTRAVENOUS at 09:28

## 2020-12-24 RX ADMIN — FENTANYL CITRATE 25 MCG: 50 INJECTION, SOLUTION INTRAMUSCULAR; INTRAVENOUS at 09:03

## 2020-12-24 RX ADMIN — LEVOFLOXACIN 500 MG: 5 INJECTION, SOLUTION INTRAVENOUS at 08:54

## 2020-12-24 RX ADMIN — FENTANYL CITRATE 25 MCG: 50 INJECTION, SOLUTION INTRAMUSCULAR; INTRAVENOUS at 08:58

## 2020-12-24 RX ADMIN — LIDOCAINE HYDROCHLORIDE 80 MG: 20 INJECTION, SOLUTION EPIDURAL; INFILTRATION; INTRACAUDAL; PERINEURAL at 08:51

## 2020-12-24 RX ADMIN — MIDAZOLAM 2 MG: 1 INJECTION INTRAMUSCULAR; INTRAVENOUS at 08:44

## 2020-12-24 RX ADMIN — SODIUM CHLORIDE, SODIUM LACTATE, POTASSIUM CHLORIDE, AND CALCIUM CHLORIDE: 600; 310; 30; 20 INJECTION, SOLUTION INTRAVENOUS at 09:36

## 2020-12-24 RX ADMIN — DEXAMETHASONE SODIUM PHOSPHATE 4 MG: 4 INJECTION, SOLUTION INTRAMUSCULAR; INTRAVENOUS at 08:59

## 2020-12-24 RX ADMIN — FENTANYL CITRATE 50 MCG: 50 INJECTION, SOLUTION INTRAMUSCULAR; INTRAVENOUS at 10:25

## 2020-12-24 RX ADMIN — FUROSEMIDE 10 MG: 10 INJECTION, SOLUTION INTRAVENOUS at 09:26

## 2020-12-24 RX ADMIN — FENTANYL CITRATE 25 MCG: 50 INJECTION, SOLUTION INTRAMUSCULAR; INTRAVENOUS at 09:11

## 2020-12-24 NOTE — ANESTHESIA POSTPROCEDURE EVALUATION
Procedure(s):  CYSTOSCOPY, LASER FRAGMENTATION OF DYSTROPHIC CALCIFICATIONS OF PROSTATE, TRANSURETHRAL RESECTION OF PROSTATE.    general    Anesthesia Post Evaluation        Comments: Post-Anesthesia Evaluation and Assessment    Cardiovascular Function/Vital Signs  BP (!) 133/57   Pulse (!) 59   Temp 36.3 °C (97.3 °F)   Resp 13   Ht 5' 10\" (1.778 m)   Wt 94.1 kg (207 lb 7 oz)   SpO2 96%   BMI 29.76 kg/m²     Patient is status post Procedure(s):  CYSTOSCOPY, LASER FRAGMENTATION OF DYSTROPHIC CALCIFICATIONS OF PROSTATE, TRANSURETHRAL RESECTION OF PROSTATE. Nausea/Vomiting: Controlled. Postoperative hydration reviewed and adequate. Pain:  Pain Scale 1: FLACC (12/24/20 1035)  Pain Intensity 1: 1 (12/24/20 1035)   Managed. Neurological Status:   Neuro (WDL): Within Defined Limits (12/24/20 1035)   At baseline. Mental Status and Level of Consciousness: Arousable. Pulmonary Status:   O2 Device: Room air (12/24/20 1035)   Adequate oxygenation and airway patent. Complications related to anesthesia: None    Post-anesthesia assessment completed. No concerns. Patient has met all discharge requirements. Signed By: Mustapha Peñaloza MD    December 24, 2020                     INITIAL Post-op Vital signs:   Vitals Value Taken Time   /79 12/24/20 1040   Temp 36.3 °C (97.3 °F) 12/24/20 0953   Pulse 57 12/24/20 1042   Resp 16 12/24/20 1042   SpO2 97 % 12/24/20 1042   Vitals shown include unvalidated device data.

## 2020-12-24 NOTE — DISCHARGE INSTRUCTIONS
[place]. Baldemar De Souza M.D. [facility]  [address], [address] [address] [place], [address]  Office: [phone]  Fax:    [phone]    PROCEDURE: Procedure(s):  CYSTOSCOPY, LASER FRAGMENTATION OF DYSTROPHIC CALCIFICATIONS OF PROSTATE, [address] [facility] IMMEDIATELY if any of the following occur:     You are unable to urinate. Urgency to urinate is not uncommon.  You find yourself urinating small frequent amounts associated with severe lower abdominal discomfort.  Bright red blood with clots in the urine. Some reddish urine is not uncommon and should be treated with increasing the amount of fluids you drink.  Temperature above 101.5° and / or chills.  You are nauseous and / or vomiting and you cannot hold down any fluids.  Your pain is not controlled with the pain medication prescribed. Special Considerations:      Do not drive for at least 24 hours after the procedure and until you are no longer taking narcotic pain medication and you are able to move and react without hesitation. MEDICATIONS:  Pain   []  Norco®   []  Percocet® []  Dilaudid®    [x]  Tramadol   Antibiotics   []  Cipro   [x]  Keflex    [] Levaquin   []  Bactrim DS®       Urination   []  Vesicare®   []  Flomax     Burning   []  Pyridium®   []  UribelTM     Nausea   []  Zofran®   []  Phenergan®     Miscellaneous   []           [] Prescriptions Written on Chart    [x] Prescriptions sent Electronically           Our office will call you tomorrow to schedule your first follow-up appointment. Please contact [place] [address] [facility] at [phone] or go to the nearest Emergency Department / Urgent Care facility for any other medical questions or concerns. DISCHARGE SUMMARY from Nurse    Increase fluids today. Take medication as noted. If dao cath is clear you may cut the piece as directed on your sheet provided.       PATIENT INSTRUCTIONS:    After general anesthesia or intravenous sedation, for 24 hours or while taking prescription Narcotics:  · Limit your activities  · Do not drive and operate hazardous machinery  · Do not make important personal or business decisions  · Do  not drink alcoholic beverages  · If you have not urinated within 8 hours after discharge, please contact your surgeon on call. Report the following to your surgeon:  · Excessive pain, swelling, redness or odor of or around the surgical area  · Temperature over 100.5  · Nausea and vomiting lasting longer than 4 hours or if unable to take medications  · Any signs of decreased circulation or nerve impairment to extremity: change in color, persistent  numbness, tingling, coldness or increase pain  · Any questions    What to do at Home:  Recommended activity: Activity as tolerated and no driving for today. If you experience any of the following symptoms as noted above, please follow up with Dr. Tom Russ. *  Please give a list of your current medications to your Primary Care Provider. *  Please update this list whenever your medications are discontinued, doses are      changed, or new medications (including over-the-counter products) are added. *  Please carry medication information at all times in case of emergency situations. These are general instructions for a healthy lifestyle:    No smoking/ No tobacco products/ Avoid exposure to second hand smoke  Surgeon General's Warning:  Quitting smoking now greatly reduces serious risk to your health.     Obesity, smoking, and sedentary lifestyle greatly increases your risk for illness    A healthy diet, regular physical exercise & weight monitoring are important for maintaining a healthy lifestyle    You may be retaining fluid if you have a history of heart failure or if you experience any of the following symptoms:  Weight gain of 3 pounds or more overnight or 5 pounds in a week, increased swelling in our hands or feet or shortness of breath while lying flat in bed. Please call your doctor as soon as you notice any of these symptoms; do not wait until your next office visit. The discharge information has been reviewed with the patient and caregiver. The patient and caregiver verbalized understanding. Discharge medications reviewed with the patient and caregiver and appropriate educational materials and side effects teaching were provided. ______________________________________________________________________________________________________________________         10 Things to Do When You Have COVID-19    Stay home. Don't go to school, work, or public areas. And don't use public transportation, ride-shares, or taxis unless you have no choice. Leave your home only if you need to get medical care. But call the doctor's office first so they know you're coming. And wear a cloth face cover. Ask before leaving isolation. Talk with your doctor or other health professional about when it will be safe for you to leave isolation. Wear a cloth face cover when you are around other people. It can help stop the spread of the virus when you cough or sneeze. Limit contact with people in your home. If possible, stay in a separate bedroom and use a separate bathroom. Avoid contact with pets and other animals. If possible, have a friend or family member care for them while you're sick. Cover your mouth and nose with a tissue when you cough or sneeze. Then throw the tissue in the trash right away. Wash your hands often, especially after you cough or sneeze. Use soap and water, and scrub for at least 20 seconds. If soap and water aren't available, use an alcohol-based hand . Don't share personal household items. These include bedding, towels, cups and glasses, and eating utensils. Clean and disinfect your home every day. Use household  or disinfectant wipes or sprays.  Take special care to clean things that you grab with your hands. These include doorknobs, remote controls, phones, and handles on your refrigerator and microwave. And don't forget countertops, tabletops, bathrooms, and computer keyboards. Take acetaminophen (Tylenol) to relieve fever and body aches. Read and follow all instructions on the label. Current as of: July 10, 2020               Content Version: 12.6  © 5810-7793 [employer], [employer]. Care instructions adapted under license by [employer] (which disclaims liability or warranty for this information). If you have questions about a medical condition or this instruction, always ask your healthcare professional. [place] [address] any warranty or liability for your use of this information. _____________      Patient armband removed and shredded.

## 2020-12-24 NOTE — PERIOP NOTES
Reviewed PTA medication list with patient/caregiver and patient/caregiver denies any additional medications. Patient admits to having a responsible adult care for them at home for at least 24 hours after surgery. Patient encouraged to use gown warming system and informed that using said warming gown to regulate body temperature prior to a procedure has been shown to help reduce the risks of blood clots and infection. Patient's pharmacy of choice verified and documented in PTA medication section. Dual skin assessment & fall risk band verification completed with Amaris Gonzales

## 2020-12-24 NOTE — H&P
Urology [address] [place], [place] [address] [address]  Tel: [phone]  Fax: [phone]      Patient: Kai Orellana  YOB: 1950  Date: 12/14/2020 2:30 PM   Visit Type: Office Visit        Assessment/Plan  # Detail Type Description    1. Assessment Gross hematuria (R31.0), Symptomatic. Patient Plan Patient with six-month intermittent history of gross hematuria. Evaluation  including CT scan and cystoscopy revealed the patient have dystrophic calcifications at the bladder neck and involving the prosthetic urethra this is most likely calcifications that formed during the healing process from his GreenLight laser of the prostate prior to the hematuria the patient was voiding very well and was pleased with his urinary symptoms. At this time I recommended that he undergo a cystoscopy with laser fragmentation of these dystrophic calcifications and cauterization any other bleeders in the prosthetic urethra postoperatively. Risks including pain infection bleeding need for catheter overnight were reviewed he understands and will proceed. 2. Assessment Dystrophic calcification of bladder (N32.89). This 78year old male presents for Elevated PSA, Hypogonadism, BPH and Blood in Urine. History of Present Illness:  1. Elevated PSA   The onset of symptoms was gradual. The problem has improved. Severity level is described as moderate. The patient''''s most recent PSA was 4.1 ng/mL taken on 05/14/2020. Pertinent history includes age over 48 and BPH. Associated symptoms include nocturia  and urinary frequency. Pertinent negatives include slow stream and urinary urgency. Additional information: the patient is sexually active and Pt is using Jayln. PSA has increaed to 10.84. He has had 2 biopsies previously. .      10/23/19  Pt with hx of rising PSA, MRI of prostate this reveals no evidence of target lesion.   He will repeat labs 6 months  1/17/20  Pt will f/u 4 mo PSA,  05/19/2020  Patient with a history of elevated PSA. It had increased over 10 at his last visit. A repeat on 05/14/2020 was down to 4.1. At this time no intervention is needed. 2.  Hypogonadism   The symptoms began gradually, have been moderate and are unchanged. The patient is here today for a follow up visit. The patient states he does not have difficultly attaining an erection and has no difficulty maintaining an erection. Pertinent history does not include diabetes or neurologic disease. He denies depression. Additional information: Pt not been using Testim for 2 month, insurance will not cover it since we do not have a record of low testosterone. His T level last year was over 500. No treatment needed  5/16/2020  Pt here for follow up. He has not been using TRT. 3.  BPH   Onset was gradual. It occurs daily. The problem is improving. There were no identified risk factors. Associated symptoms include nocturia and urinary frequency. Pertinent negatives include chills, constipation, fever, slow stream and urgency. Additional information: Pt continues to use Jayln. This works well for him and he is not having any side effects. No rx needed today. 10/16/19   Pt noted to have a very large prostate on MRI 6.4 x 6.3 x 7.0. Despite using Mozella Sanjuana he cont to have alot of urinary issues  Treatment options reviewed and he wants to proceed w GreenLight laser of prostate  12/9/19  Pt cont to have BPH issues. He is scheduled for Greenlight laser of the prostate  1/17/20  Pt here for follow up. He is voiding well. He is pleased. He underwent Greenlight 12/12/19 using [id number]. He will cont Jayln. 5/19/2020  Pt is voiding well at this time. He had some recent hematuria, this resolved spontaneously. 4.  Blood in Urine   The patient presents with total hematuria (gross with clots). The problem began suddenly. The problem has worsened. He denies pain.  Pertinent history includes being at least 40 years of age but not history of UTIs or prior prostate surgeries. He also complains of nocturia, being sexually active and urinary frequency. He denies chills, fever, nausea, slow stream, urgency and vomiting. Additional information: Patient has had 6 months of intermittent gross hematuria. This only begins after he begins to do some running exercise it then continues until about his 3rd void. His urine that appears clear again. He did bring sample in recently which showed no evidence of any infection he underwent a GreenLight laser on 12/12/2019 continues use Jayln. Patient continues to have intermittent gross hematuria. He is here today to complete workup. CT scan performed revealed:  1. 1.3 x 1.6 cm calcification at the central urinary bladder base is contiguous with   calcific density extending into the prostatic urethra. This is favored to represent a   urinary bladder calculus, but there also dystrophic prostate calcifications. 2. Moderate prostate enlargement. 3. No evidence of renal or ureteral calculi. 4. Mild diverticulosis of the left colon without acute diverticulitis. 5. Mild chronic scarring or atelectasis at the lung bases. In addition a urine cytology showed no malignant cells. PAST MEDICAL/SURGICAL HISTORY   (Reviewed, updated)    Disease/disorder Onset Date Management Date Comments     rotator cuff repair 01/2014      right hand/finger 2012      Cataract 2011      sinus surgery       Bilateral Hernia repair       Jaw surgery     Prostatitis             PROBLEM LIST:   Problem List reviewed.    Problem Description Onset Date Chronic Clinical Status Notes   Raised prostate specific antigen 11/25/2008 Y     Benign prostatic hyperplasia 11/25/2008 Y     Testicular hypofunction 11/25/2008 Y           Medications (active prior to today)  Medication Name Sig Description Start Date Stop Date Refilled Rx Elsewhere   multivitamin  06/30/2014   Jon Alatorre 0.5-0.4 CAP TAKE ONE CAPSULE BY MOUTH DAILY 30 MINUTES AFTER SAME MEAL DAILY 2020 N   Ativan 2 mg tablet take 1 tablet by ORAL route once 45 min prior to procedure 2020   N   amoxicillin 500 mg capsule take 1 capsule by oral route  every 12 hours 2020  N     Medication Reconciliation  Medications reconciled today. Medication Reviewed  Adherence Medication Name Sig Desc Elsewhere Status   taking as directed multivitamin  Y Verified   taking as directed DUTASTERIDE-TAMSULOSIN 0.5-0.4 CAP TAKE ONE CAPSULE BY MOUTH DAILY 30 MINUTES AFTER SAME MEAL DAILY N Verified   taking as directed Ativan 2 mg tablet take 1 tablet by ORAL route once 45 min prior to procedure N Verified     Allergies  Ingredient Reaction (Severity) Medication Name Comment   NO KNOWN ALLERGIES        Reviewed, no changes. Family History  (Reviewed, updated)  Relationship Family Member Name  Age at Death Condition Onset Age Cause of Death       Family history of Diabetes mellitus  N       Family history of Cancer, prostate  N   Family h/o    Cancer - prostate           Social History:  (Reviewed, updated)  Preferred language is [place]. MARITAL STATUS/FAMILY/SOCIAL SUPPORT  Marital status:    Tobacco use status: Never smoked tobacco.  Smoking status: Never smoker. TOBACCO/VAPING EXPOSURE  No passive smoke exposure. ALCOHOL  There is no history of alcohol use. CAFFEINE  The patient uses caffeine: coffee and chocolate. - 3 cups a day. Review of Systems  System Neg/Pos Details   Constitutional Negative Chills, Fever and Pain. ENMT Negative Ear infections and Sore throat. Eyes Negative Blurred vision, Double vision and Eye pain. Respiratory Negative Asthma, Chronic cough, Dyspnea and Wheezing. Cardio Negative Chest pain. GI Negative Constipation, Decreased appetite, Diarrhea, Nausea and Vomiting.  Positive Hematuria, Nocturia, Urinary frequency.     Negative Slow stream and Urgency. Endocrine Negative Cold intolerance, Heat intolerance, Increased thirst and Weight loss. Neuro Negative Headache and Tremors. Psych Negative Anxiety and Depression. Integumentary Negative Itching skin and Rash. MS Negative Back pain and Joint pain. Hema/Lymph Negative Easy bleeding. Reproductive Negative Sexual dysfunction. Vital Signs   Height  Time ft in cm Last Measured Height Position   2:41 PM 5.0 8.50 173.99 12/03/2019 Standing   Weight/BSA/BMI  Time lb oz kg Context BMI kg/m2 BSA m2   2:41 .80  95.164  31.44    Measured By  Time Measured by   2:41 PM Curt Goetz     Physical Exam  Exam Findings Details   Constitutional Normal Well developed. Neck Exam Normal Inspection - Normal.   Respiratory Normal Inspection - Normal.   Extremity Normal No edema. Neurological Normal Alert and oriented to person, place and time. Cranial nerves intact. No motor or sensory deficits. Psychiatric Normal Orientation - Oriented to time, place, person & situation. Appropriate mood and affect. Immunizations Entered by History    Date Immunization   4/30/2019 12:00:00 AM Shingrix   12/29/2018 12:00:00 AM Seasonal trivalent influenza vaccine, adjuvanted, preservative free   12/29/2018 12:00:00 AM Shingrix   1/18/2018 12:00:00 AM Influenza, injectable, Madin Lainey Canine Kidney, preservative free, quadrivalent   12/3/2016 12:00:00 AM Influenza, seasonal, injectable   12/5/2015 12:00:00 AM Influenza, seasonal, injectable       Procedures:      Patient consent:  Consent was obtained. The procedure and risks were explained in detail. Questions were encouraged and answered. The patient was prepped and draped in the usual sterile fashion. Procedure:  A diagnostic cystourethroscopy was performed using a 16 Chadian flexible cystoscope  Anesthesia:  Lidocaine Jelly 1%  Patient position:  Supine. Patient response:  Patient tolerated procedure well.  Patient was given instructions. Patient was discharged in stable condition. Findings:   Prostatic urethra post TURP. The bladder Dystrophic calcifications bladder. Ureteral orifices normal in appearance. Dystrophic calcifications extending from bladder neck into prosthetic urethra  Antibiotics:  No antibiotics given. Impression:  Gross hematuria R31.0. Patient Education  # Patient Education   1. Cystoscopy: Care Instructions     Medications (added, continued, or stopped today)  Start Date Medication Directions PRN Status PRN Reason Instruction Stop Date   12/08/2020 amoxicillin 500 mg capsule take 1 capsule by oral route  every 12 hours N   12/14/2020 12/07/2020 Ativan 2 mg tablet take 1 tablet by ORAL route once 45 min prior to procedure N      09/17/2020 DUTASTERIDE-TAMSULOSIN 0.5-0.4 CAP TAKE ONE CAPSULE BY MOUTH DAILY 30 MINUTES AFTER SAME MEAL DAILY N      06/30/2014 multivitamin  N      Active Patient Care Team Members    Name Contact Agency Type Support Role Relationship Active Date Inactive Date Specialty   Dami Kulkarni   encounter provider    Urology   [address]   Emergency Contact Spouse      Lm Fernández   Patient provider PCP   Family Practice       Provider: Dami Kulkarni MD 12/14/2020 02:30 PM  Document generated by:  Anita Retana 12/16/2020 07:51 AM        Electronically signed by Dami Kulkarni MD on 12/17/2020 03:09 PM

## 2020-12-24 NOTE — OP NOTES
Postoperative Note    Patient: Yodit Limon  YOB: 1950  MRN: [id number]    Date of Procedure: 12/24/2020     Pre-Op Diagnosis: Dystrophic calcifications of prostatic urethra gross hematuria    Post-Op Diagnosis: Same as preoperative diagnosis. Procedure(s):  CYSTOSCOPY, LASER FRAGMENTATION OF DYSTROPHIC CALCIFICATIONS OF PROSTATE, TRANSURETHRAL RESECTION OF PROSTATE    Surgeon(s):  Valarie Adame MD    Surgical Assistant: Rosanna Bunn    Anesthesia: General    Estimated Blood Loss (mL): Minimal    Complications: None    Specimens:   ID Type Source Tests Collected by Time Destination   1 : Bladder stones Fresh Bladder  Valarie Adame MD 12/24/2020 0908 Pathology   2 : prostate chips Preservative Prostate  Valarie Adame MD 12/24/2020 7660 Pathology        Implants: * No implants in log *    Drains: * No LDAs found *    Findings: Dystrophic calcifications involving almost the entire prostatic urethra    Indications:  Patient is a 82-year-old male who is very active. He underwent a greenlight laser of the prostate last year and had been doing very well up until recently. After doing activity he began noticing gross hematuria. The ostomy my office revealed a significant amount of dystrophic calcifications that appeared performed on the surface of the prostatic urethra. He is here today for definitive treatment. Procedure:  Brought the operating room placed in supine position. After ministration of general anesthesia he was placed in the lithotomy position. Groin genitalia prepped draped in usual sterile fashion. With a 21 Mozambican cystoscope cystourethroscopy was performed. Note was similar dystrophic calcifications that I identified in my office when I was performing a cystoscopy. I began using the holmium laser and I was able to laser off some of these calcifications that were sitting on the surface of the mucosa.   However once that was completed there was still significant mount of dystrophic calcifications that appear to be part of the prostatic urethral tissue. I was concerned that these would be continued to grow and eventually grow into the prostatic urethra as well. I emptied the bladder and remove the stones these were sent for pathology. I then removed the cystoscope and placed a 26 Lithuanian resectoscope into the bladder without difficulty. I then used the loop and I performed a TURP of the residual superficial prostatic tissue that contained the dystrophic calcifications. Once there were no residual areas that contain these calcifications I emptied the bladder removed all the prostatic chips. I then used a roller bar and cauterized the surface until there was no evidence of any active bleeding. At this point there was no active bleeding seen both ureteral orifice ease were normal there were no other areas contain dystrophic calcifications. Static chips will be sent for pathologic evaluation. 10 milligrams of Lasix was given intravenously to promote diuresis. 25 [place] coudé catheter placed into the bladder. Light pink reflux was identified. Catheter was placed to a leg bag. Patient transferred to recovery room in stable and satisfactory condition.

## 2020-12-24 NOTE — PERIOP NOTES
IV to left FA DC'd and cath tip intact, no further bleeding noted and bandage applied. DC in wheelchair with all belongings and dao cath care and DC instructions with wife to car. Patient armband removed and shredded.

## 2020-12-24 NOTE — PERIOP NOTES
Education completed via telephone with wife, Hellen Dong understanding and education folder given to patient.

## 2020-12-24 NOTE — BRIEF OP NOTE
Brief Postoperative Note    Patient: Ofelia Larios  YOB: 1950  MRN: [id number]    Date of Procedure: 12/24/2020     Pre-Op Diagnosis: Dystrophic calcifications of prostatic urethra gross hematuria    Post-Op Diagnosis: Same as preoperative diagnosis.       Procedure(s):  CYSTOSCOPY, LASER FRAGMENTATION OF DYSTROPHIC CALCIFICATIONS OF PROSTATE, TRANSURETHRAL RESECTION OF PROSTATE    Surgeon(s):  Alonzo Pisano MD    Surgical Assistant: Isi Mead    Anesthesia: General    Estimated Blood Loss (mL): Minimal    Complications: None    Specimens:   ID Type Source Tests Collected by Time Destination   1 : Bladder stones Fresh Bladder  Alonzo Pisano MD 12/24/2020 0908 Pathology   2 : prostate chips Preservative Prostate  Alonzo Pisano MD 12/24/2020 8342 Pathology        Implants: * No implants in log *    Drains: * No LDAs found *    Findings: Dystrophic calcifications involving almost the entire prostatic urethra    Electronically Signed by Fady Fowler MD on 12/24/2020 at 10:07 AM

## 2020-12-24 NOTE — INTERVAL H&P NOTE
Update History & Physical 
 
The Patient's History and Physical of December 15,  
2020 was reviewed with the patient and I examined the patient. There was no change. The surgical site was confirmed by the patient and me. Plan:  The risk, benefits, expected outcome, and alternative to the recommended procedure have been discussed with the patient. Patient understands and wants to proceed with the procedure.  
 
Electronically signed by Fady Fowler MD on 12/24/2020 at 7:01 AM

## 2020-12-24 NOTE — ANESTHESIA PREPROCEDURE EVALUATION
Relevant Problems   No relevant active problems       Anesthetic History   No history of anesthetic complications            Review of Systems / Medical History  Patient summary reviewed, nursing notes reviewed and pertinent labs reviewed    Pulmonary            Asthma : well controlled  Pertinent negatives: No COPD, recent URI, sleep apnea and smoker     Neuro/Psych   Within defined limits           Cardiovascular  Within defined limits                Exercise tolerance: >4 METS     GI/Hepatic/Renal         Renal disease: stones    Pertinent negatives: No GERD, hepatitis and liver disease   Endo/Other  Within defined limits           Other Findings              Physical Exam    Airway  Mallampati: III  TM Distance: 4 - 6 cm  Neck ROM: normal range of motion   Mouth opening: Normal     Cardiovascular  Regular rate and rhythm,  S1 and S2 normal,  no murmur, click, rub, or gallop             Dental  No notable dental hx       Pulmonary  Breath sounds clear to auscultation               Abdominal  GI exam deferred       Other Findings            Anesthetic Plan    ASA: 2  Anesthesia type: general          Induction: Intravenous  Anesthetic plan and risks discussed with: Patient      GA/LMA

## 2020-12-26 ENCOUNTER — HOSPITAL ENCOUNTER (EMERGENCY)
Age: 70
Discharge: HOME | End: 2020-12-26
Attending: EMERGENCY MEDICINE
Payer: COMMERCIAL

## 2020-12-26 VITALS
DIASTOLIC BLOOD PRESSURE: 64 MMHG | HEIGHT: 70 IN | WEIGHT: 200 LBS | SYSTOLIC BLOOD PRESSURE: 124 MMHG | BODY MASS INDEX: 28.63 KG/M2 | RESPIRATION RATE: 14 BRPM | HEART RATE: 72 BPM | OXYGEN SATURATION: 99 % | TEMPERATURE: 97.3 F

## 2020-12-26 DIAGNOSIS — Z46.6 ENCOUNTER FOR URINARY CATHETER: ICD-10-CM

## 2020-12-26 DIAGNOSIS — R33.9 URINARY RETENTION: Primary | ICD-10-CM

## 2020-12-26 PROCEDURE — 99284 EMERGENCY DEPT VISIT MOD MDM: CPT

## 2020-12-26 PROCEDURE — 51702 INSERT TEMP BLADDER CATH: CPT

## 2020-12-26 PROCEDURE — 51798 US URINE CAPACITY MEASURE: CPT

## 2020-12-26 RX ORDER — CEPHALEXIN 250 MG/1
500 CAPSULE ORAL 4 TIMES DAILY
COMMUNITY
End: 2021-10-11

## 2020-12-26 NOTE — ED PROVIDER NOTES
EMERGENCY DEPARTMENT HISTORY AND PHYSICAL EXAM    Date: 12/26/2020  Patient Name: Judy Acosta    History of Presenting Illness     Chief Complaint   Patient presents with    Urinary Retention         History Provided By: Patient and Dr Richard Harvey is a 78 y.o. male with PMHX of BPH, greenlight laser, bladder stone who presents to the emergency department C/O urinary retention. Patient reports he had elective removal of a large bladder stone 2 days ago by Dr. Leonela Carrion. Patient was discharged with indwelling catheter which he removed at home yesterday. He says that since then he has been unable to void and was directed to go to the emergency department by Dr. Leonela Carrion. He tells me that after the procedure he was having some hematuria however this stopped. After removing the catheter yesterday he had a lot of pain at his urethra. He reports just dribbling since then. PCP: Peña Peguero MD    Current Outpatient Medications   Medication Sig Dispense Refill    cephALEXin (Keflex) 250 mg capsule Take 500 mg by mouth four (4) times daily.  sodium chloride (AYR SALINE) 0.65 % drop 2 Drops by Both Nostrils route as needed for Congestion.  fluticasone propion-salmeteroL (Advair Diskus) 250-50 mcg/dose diskus inhaler Take 1 Puff by inhalation every twelve (12) hours.  multivitamin (ONE A DAY) tablet Take 1 Tab by mouth daily.  Dutasteride-Tamsulosin (JENNY) 0.5-0.4 mg CM24 Take  by mouth daily.          Past History     Past Medical History:  Past Medical History:   Diagnosis Date    Asthma     BPH (benign prostatic hypertrophy)     Cataract     Chronic pain     lower back    Other ill-defined conditions(999.89)     low testerone       Past Surgical History:  Past Surgical History:   Procedure Laterality Date    HX CATARACT REMOVAL Bilateral     HX HEENT      sinus surgery    HX MOHS PROCEDURES  1/8/14    right shoulder    HX ORTHOPAEDIC Right 2009    ORIF 5th finger  HX ORTHOPAEDIC Right 2009    Removal pin 5th finger    HX ROTATOR CUFF REPAIR Right 2010's       Family History:  History reviewed. No pertinent family history. Social History:  Social History     Tobacco Use    Smoking status: Never Smoker    Smokeless tobacco: Never Used   Substance Use Topics    Alcohol use: No    Drug use: Never       Allergies: Allergies   Allergen Reactions    Other Plant, Animal, Environmental Runny Nose     Pt states he has seasonal allergies           Review of Systems   Review of Systems   Constitutional: Negative for chills and fever. Gastrointestinal: Negative for nausea and vomiting. Genitourinary: Positive for difficulty urinating and penile pain. Negative for hematuria and testicular pain. All other systems reviewed and are negative. Physical Exam     Vitals:    12/26/20 0911   BP: 132/66   Pulse: 70   Resp: 18   Temp: 97.3 °F (36.3 °C)   SpO2: 99%   Weight: 90.7 kg (200 lb)   Height: 5' 10\" (1.778 m)     Physical Exam  Vitals signs and nursing note reviewed. Constitutional:       General: He is not in acute distress. Appearance: Normal appearance. HENT:      Head: Normocephalic and atraumatic. Eyes:      Extraocular Movements: Extraocular movements intact. Conjunctiva/sclera: Conjunctivae normal.   Neck:      Musculoskeletal: Normal range of motion. Pulmonary:      Effort: Pulmonary effort is normal. No respiratory distress. Abdominal:      General: There is no distension. Palpations: Abdomen is soft. Tenderness: There is no abdominal tenderness. Genitourinary:     Penis: Normal.       Testes: Normal.      Comments: Catheter in place  Musculoskeletal: Normal range of motion. General: No deformity. Neurological:      General: No focal deficit present. Mental Status: He is alert and oriented to person, place, and time. Mental status is at baseline.    Psychiatric:         Mood and Affect: Mood normal. Behavior: Behavior normal.           Diagnostic Study Results     Labs -   No results found for this or any previous visit (from the past 12 hour(s)). Radiologic Studies -   No orders to display     CT Results  (Last 48 hours)    None        CXR Results  (Last 48 hours)    None          Medications given in the ED-  Medications - No data to display      Medical Decision Making   I am the first provider for this patient. I reviewed the vital signs, available nursing notes, past medical history, past surgical history, family history and social history. Vital Signs-Reviewed the patient's vital signs. Pulse Oximetry Analysis and Interpretation:   9% on 9RA, noraml      Records Reviewed: Nursing Notes and Old Medical Records    Provider Notes (Medical Decision Making): Ari Lee is a 78 y.o. male here with urinary retention. 16f coude Catheter placed by nursing with drainage of yellow clear urine. Patient feels better. Discussed catheter management which she is already aware of after previous experience. Not currently having gross hematuria or blood clots. Discussed reasons to return to the emergency department such as obstruction of catheter and returning retention. He will call Dr. Jessica Wright office and already has a follow-up appointment arranged for next week    Procedures:  Procedures    ED Course:       Diagnosis and Disposition     Critical Care:     DISCHARGE NOTE:    Layton Alaniz's  results have been reviewed with him. He has been counseled regarding his diagnosis, treatment, and plan. He verbally conveys understanding and agreement of the signs, symptoms, diagnosis, treatment and prognosis and additionally agrees to follow up as discussed. He also agrees with the care-plan and conveys that all of his questions have been answered.   I have also provided discharge instructions for him that include: educational information regarding their diagnosis and treatment, and list of reasons why they would want to return to the ED prior to their follow-up appointment, should his condition change. He has been provided with education for proper emergency department utilization. CLINICAL IMPRESSION:    1. Urinary retention    2. Encounter for urinary catheter        PLAN:  1. D/C Home  2. Current Discharge Medication List        3. Follow-up Information     Follow up With Specialties Details Why Contact Info    Sen Moran MD [facility] Medicine Schedule an appointment as soon as possible for a visit  [facility] care follow [facility]  [phone]      Rocco Barber MD Urology Schedule an appointment as soon as possible for a visit in 1 week For primary care follow up [facility]  [phone]          _______________________________      Please note that this dictation was completed with [employer], the computer voice recognition software. Quite often unanticipated grammatical, syntax, homophones, and other interpretive errors are inadvertently transcribed by the computer software. Please disregard these errors. Please excuse any errors that have escaped final proofreading.

## 2020-12-26 NOTE — ED TRIAGE NOTES
Pt sent by Dr. Sabas Mendez to get a dao. He had surgery on Dec 24th and took the dao out yesterday but isn't able to urinate at this time. Dr. Carolina Hollingsworth.

## 2021-01-06 LAB
CALCIUM OXALATE DIHYDRATE MFR STONE IR: 50 %
COLOR STONE: NORMAL
COM MFR STONE: 45 %
COMMENT, 519994: NORMAL
COMPOSITION, 120440: NORMAL
DISCLAIMER, STO32L: NORMAL
HYDROXYAPATITE: 5 %
PHOTO, 120675: NORMAL
PLEASE NOTE, 130422: NORMAL
SIZE STONE: NORMAL MM
SPECIMEN SOURCE: NORMAL
WT STONE: 88 MG

## 2021-10-11 ENCOUNTER — TRANSCRIBE ORDER (OUTPATIENT)
Dept: REGISTRATION | Age: 71
End: 2021-10-11

## 2021-10-11 ENCOUNTER — HOSPITAL ENCOUNTER (OUTPATIENT)
Dept: PREADMISSION TESTING | Age: 71
Discharge: HOME | End: 2021-10-11

## 2021-10-11 ENCOUNTER — HOSPITAL ENCOUNTER (OUTPATIENT)
Dept: PREADMISSION TESTING | Age: 71
Discharge: HOME | End: 2021-10-11
Payer: COMMERCIAL

## 2021-10-11 VITALS — WEIGHT: 195 LBS | BODY MASS INDEX: 27.92 KG/M2 | HEIGHT: 70 IN

## 2021-10-11 DIAGNOSIS — K40.91 UNILATERAL RECURRENT INGUINAL HERNIA: ICD-10-CM

## 2021-10-11 DIAGNOSIS — D36.16 BENIGN NEOPLASM OF PERIPHERAL NERVE OF PELVIS REGION: ICD-10-CM

## 2021-10-11 DIAGNOSIS — K40.91 UNILATERAL RECURRENT INGUINAL HERNIA: Primary | ICD-10-CM

## 2021-10-11 LAB
ANION GAP SERPL CALC-SCNC: 3 MMOL/L (ref 3–18)
ATRIAL RATE: 64 BPM
BUN SERPL-MCNC: 14 MG/DL (ref 7–18)
BUN/CREAT SERPL: 13 (ref 12–20)
CALCIUM SERPL-MCNC: 9.2 MG/DL (ref 8.5–10.1)
CALCULATED P AXIS, ECG09: 54 DEGREES
CALCULATED R AXIS, ECG10: -35 DEGREES
CALCULATED T AXIS, ECG11: 96 DEGREES
CHLORIDE SERPL-SCNC: 108 MMOL/L (ref 100–111)
CO2 SERPL-SCNC: 30 MMOL/L (ref 21–32)
CREAT SERPL-MCNC: 1.12 MG/DL (ref 0.6–1.3)
DIAGNOSIS, 93000: NORMAL
GLUCOSE SERPL-MCNC: 100 MG/DL (ref 74–99)
HCT VFR BLD AUTO: 46.2 % (ref 36–48)
HGB BLD-MCNC: 15.5 G/DL (ref 13–16)
P-R INTERVAL, ECG05: 170 MS
POTASSIUM SERPL-SCNC: 5.2 MMOL/L (ref 3.5–5.5)
Q-T INTERVAL, ECG07: 446 MS
QRS DURATION, ECG06: 156 MS
QTC CALCULATION (BEZET), ECG08: 460 MS
SODIUM SERPL-SCNC: 141 MMOL/L (ref 136–145)
VENTRICULAR RATE, ECG03: 64 BPM

## 2021-10-11 PROCEDURE — 36415 COLL VENOUS BLD VENIPUNCTURE: CPT

## 2021-10-11 PROCEDURE — 85018 HEMOGLOBIN: CPT

## 2021-10-11 PROCEDURE — 93005 ELECTROCARDIOGRAM TRACING: CPT

## 2021-10-11 PROCEDURE — U0005 INFEC AGEN DETEC AMPLI PROBE: HCPCS

## 2021-10-11 PROCEDURE — 80048 BASIC METABOLIC PNL TOTAL CA: CPT

## 2021-10-11 RX ORDER — SODIUM CHLORIDE, SODIUM LACTATE, POTASSIUM CHLORIDE, CALCIUM CHLORIDE 600; 310; 30; 20 MG/100ML; MG/100ML; MG/100ML; MG/100ML
125 INJECTION, SOLUTION INTRAVENOUS CONTINUOUS
Status: CANCELLED | OUTPATIENT
Start: 2021-10-11

## 2021-10-11 NOTE — PERIOP NOTES
Patient educated on NPO, CHG, and current COVID 19 protocols. Patient states he is fully vaccinated, with vaccination record in Media. Patient verbally agrees to [facility] screening on 10/11/2021 with self quarantine from that date till [address]. Eric Amaya Medications reviewed. Patient educated on current visitation policies. Patient advised to leave valuables at home or with a loved one. All questions answered by RN.

## 2021-10-12 LAB — SARS-COV-2, COV2NT: NOT DETECTED

## 2021-10-12 NOTE — H&P
Assessment/Plan  # Detail Type Description    1. Assessment Inguinal hernia (K40.90). Patient Plan Discussed repair and recommend robotic repair. I have discussed the risks benefits and alternatives of the procedure to the patient including bleeding, infection, use of mesh, myofascial release, chronic post op pain, reason and side effects of nerve resections, recurrence and loss of testicle. They understand and wish to proceed. I have discussed the risks, benefits and alternatives of the procedure to the patient including bleeding, infection, possible hernia repair and complications associated with that procedure, chronic pain and or numbness. They understand that this operation may not work. They understand and wish to proceed         2. Assessment Recurrent inguinal hernia without obstruction (K40.91). 3. 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 4. Assessment Body mass index (BMI) 31.0-31.9, adult (Z68.31). Plan Orders Today's instructions / counseling include(s) Lifestyle education regarding diet. Pain Management Plan  Pain Scale: 4/10. Method: Numeric Pain Intensity Scale. This 73year old male presents for Hernia. History of Present Illness  1. Hernia   , LLQ  There is radiation to groin. The patient describes it as dull. Identified risk factors include history of hernias. Additional information: Recurrent LIH, repaired years ago and open with mesh. .            Problem List  Problem List reviewed.    Problem Description Onset Date Chronic Clinical Status Notes   Raised prostate specific antigen 11/25/2008 Y     Benign prostatic hyperplasia 11/25/2008 Y     Testicular hypofunction 11/25/2008 Y         Past Medical/Surgical History   (Detailed)  Disease/disorder Onset Date Management Date Comments     cysto, holium laser fragmentation of dystrophic prostatic calcifications, TURP 12/24/2020      rotator cuff repair 01/2014      right hand/finger       Cataract 2011      sinus surgery       Bilateral Hernia repair       Jaw surgery     Prostatitis           Family History   (Detailed)    Relationship Family Member Name  Age at Death Condition Onset Age Cause of Death       Family history of Diabetes mellitus  N       Family history of Cancer, prostate  N   Family h/o    Cancer - prostate       Social History  (Detailed)  Tobacco use reviewed. Preferred language is [place]. Marital Status/Family/Social Support  Marital status:      Tobacco use status: Never smoked tobacco.    Smoking status: Never smoker. Tobacco Screening  Patient has never used tobacco. Patient has not used tobacco in the last 30 days. Patient has not used smokeless tobacco in the last 30 days. Smoking Status  Type Smoking Status Usage Per Day Years Used Pack Years Total Pack Years    Never smoker         Tobacco/Vaping Exposure  No passive smoke exposure. Alcohol  There is no history of alcohol use. Caffeine  The patient uses caffeine: coffee and chocolate. - 3 cups a day. Medications (active prior to today)  Medication Instructions Start Date Stop Date Refilled Elsewhere   multivitamin  2014   Y   DUTASTERIDE-TAMSULOSIN 0.5-0.4 CAP TAKE ONE CAPSULE BY MOUTH DAILY 30 MINUTES AFTER SAME MEAL DAILY 2020 N   Advair Diskus 250 mcg-50 mcg/dose powder for inhalation Take 1 Puff by inhalation every twelve (12) hours. //   Y   [place] Saline 0.65 % nasal drops 2 Drops by Both Nostrils route as needed for Congestion. //   Y   amoxicillin 875 mg-potassium clavulanate 125 mg tablet take 1 tablet by oral route  every 12 hours 2021   N     Patient Status   Completed with information received for patient transitioning into care. Medication Reconciliation  Medications reconciled today.     Medication Reviewed  Adherence Medication Name Sig Desc Elsewhere Status   taking as directed multivitamin  Y Verified   taking as directed DUTASTERIDE-TAMSULOSIN 0.5-0.4 CAP TAKE ONE CAPSULE BY MOUTH DAILY 30 MINUTES AFTER SAME MEAL DAILY N Verified   taking as directed Advair Diskus 250 mcg-50 mcg/dose powder for inhalation Take 1 Puff by inhalation every twelve (12) hours. Y Verified   taking as directed [place] Saline 0.65 % nasal drops 2 Drops by Both Nostrils route as needed for Congestion. Y Verified   taking as directed amoxicillin 875 mg-potassium clavulanate 125 mg tablet take 1 tablet by oral route  every 12 hours N Verified     Allergies  Ingredient Reaction (Severity) Medication Name Comment   NO KNOWN ALLERGIES        Reviewed, no changes. Review of Systems  System Neg/Pos Details   Constitutional Negative Fever, Night sweats and Weight loss. ENMT Negative Hearing loss, Tinnitus, Vertigo and Voice change. Eyes Negative Diplopia and Vision loss. Respiratory Negative Asthma, Cough, Dyspnea, Hemoptysis, Known TB exposure and Wheezing. Cardio Negative Chest pain, Claudication, Edema, Irregular heartbeat/palpitations and Thrombophlebitis. GI Negative Bloating, Dysphagia, Hemorrhoids, Jaundice and Reflux.  Negative Dysuria, Nocturia, Passage stone/gravel and Urinary incontinence. Endocrine Negative Cold intolerance and Goiter. Neuro Negative Focal weakness, Headache, Paresthesia, Seizures and Syncope. Integumentary Negative Change in shape/size of mole(s) and Skin lesion. MS Negative Back pain, Bone/joint symptoms and Muscle weakness. Hema/Lymph Negative Easy bleeding and Easy bruising. Allergic/Immuno Negative Contact allergy and Contact dermatitis.        Vital Signs   Height  Time ft in cm Last Measured Height Position   1:52 PM 5.0 8.50 173.99 08/12/2021 Standing     Weight/BSA/BMI  Time lb oz kg Context BMI kg/m2 BSA m2   1:52 .80  95.617 dressed with shoes 31.59      Blood Pressure  Time BP mm/Hg Position Side Site Method Cuff Size   1:52 /70 sitting left arm manual adult Temperature/Pulse/Respiration  Time Temp F Temp C Temp Site Pulse/min Pattern Resp/ min   1:52 PM 97.90 36.61 infrared 62 regular      Pulse Oximetry/FIO2  Time Pulse Ox (Rest %) Pulse Ox (Amb %) O2 Sat O2 L/Min Timing FiO2 % L/min Delivery Method Finger Probe   1:52 PM 95  RA      R Index     Pain Scale  Time Pain Score Method   1:52 PM 4/10 Numeric Pain Intensity Scale     Measured by  Time Measured by   1:52 PM Channing Gould     Physical Exam  Exam Findings Details   Constitutional Normal Well developed. Eyes Normal Conjunctiva - Right: Normal, Left: Normal. Pupil - Right: Normal, Left: Normal.   Neck Exam Normal Inspection - Normal.   Respiratory Normal Inspection - Normal. Auscultation - Normal. Effort - Normal.   Cardiovascular Normal Regular rate and rhythm. No murmurs, gallops, or rubs. Vascular Normal Capillary refill - Less than 2 seconds. Abdomen Normal Inspection - Normal. No abdominal tenderness. No hepatic enlargement. No spleen enlargement. Genitourinary * Hernia - Hernia Type: inguinal. Location: left, Reducible,Comment(s): Recurrent. Genitourinary Normal Testes - Normal.   Skin Normal Inspection - Normal.   Musculoskeletal Normal Visual overview of all four extremities is normal.   Extremity Normal No edema. Neurological Normal Memory - Normal. Cranial nerves - Cranial nerves II through XII grossly intact. Psychiatric Normal Orientation - Oriented to time, place, person & situation. Appropriate mood and affect. Normal insight. Normal judgment.      Immunizations Entered by History  Date Immunization   11/1/2010 12:00:00 AM Pneumococcal Vaccine (Unspecified Type)   4/30/2019 12:00:00 AM Shingrix   12/29/2018 12:00:00 AM Seasonal trivalent influenza vaccine, adjuvanted, preservative free   12/29/2018 12:00:00 AM Shingrix   1/18/2018 12:00:00 AM Influenza, injectable, Madin Lainey Canine Kidney, preservative free, quadrivalent   12/3/2016 12:00:00 AM Influenza, seasonal, injectable 12/5/2015 12:00:00 AM Influenza, seasonal, injectable         Medications (added, continued, or stopped this visit)  Start Date Medication Directions PRN Status PRN Reason Instruction Stop Date    Advair Diskus 250 mcg-50 mcg/dose powder for inhalation Take 1 Puff by inhalation every twelve (12) hours. N      07/20/2021 amoxicillin 875 mg-potassium clavulanate 125 mg tablet take 1 tablet by oral route  every 12 hours N       [place] Saline 0.65 % nasal drops 2 Drops by Both Nostrils route as needed for Congestion.  N      09/17/2020 DUTASTERIDE-TAMSULOSIN 0.5-0.4 CAP TAKE ONE CAPSULE BY MOUTH DAILY 30 MINUTES AFTER SAME MEAL DAILY N      06/30/2014 multivitamin  N        Active Patient Care Team Members  Name Contact Agency Type Support Role Relationship Active Date Inactive Date Specialty   Delayne Little   encounter provider    Urology   Janine Sagastume    Spouse      Janine Sagastume   Emergency Contact Spouse      Lm Fernández   Patient provider PCP   [facility]

## 2021-10-13 ENCOUNTER — HOSPITAL ENCOUNTER (OUTPATIENT)
Age: 71
Setting detail: OUTPATIENT SURGERY
Discharge: HOME | End: 2021-10-13
Attending: SURGERY | Admitting: SURGERY
Payer: COMMERCIAL

## 2021-10-13 ENCOUNTER — ANESTHESIA (OUTPATIENT)
Dept: SURGERY | Age: 71
End: 2021-10-13
Payer: COMMERCIAL

## 2021-10-13 ENCOUNTER — ANESTHESIA EVENT (OUTPATIENT)
Dept: SURGERY | Age: 71
End: 2021-10-13
Payer: COMMERCIAL

## 2021-10-13 VITALS
RESPIRATION RATE: 18 BRPM | HEART RATE: 63 BPM | BODY MASS INDEX: 28.85 KG/M2 | DIASTOLIC BLOOD PRESSURE: 62 MMHG | OXYGEN SATURATION: 99 % | HEIGHT: 70 IN | SYSTOLIC BLOOD PRESSURE: 120 MMHG | WEIGHT: 201.5 LBS | TEMPERATURE: 97.2 F

## 2021-10-13 DIAGNOSIS — K40.91 UNILATERAL RECURRENT INGUINAL HERNIA WITHOUT OBSTRUCTION OR GANGRENE: Primary | ICD-10-CM

## 2021-10-13 PROCEDURE — 77030031492 HC PRT ACC BLNT AIRSEAL CNMD -B: Performed by: SURGERY

## 2021-10-13 PROCEDURE — 2709999900 HC NON-CHARGEABLE SUPPLY: Performed by: SURGERY

## 2021-10-13 PROCEDURE — 77030013079 HC BLNKT BAIR HGGR 3M -A: Performed by: NURSE ANESTHETIST, CERTIFIED REGISTERED

## 2021-10-13 PROCEDURE — C1781 MESH (IMPLANTABLE): HCPCS | Performed by: SURGERY

## 2021-10-13 PROCEDURE — 77030006643: Performed by: NURSE ANESTHETIST, CERTIFIED REGISTERED

## 2021-10-13 PROCEDURE — 76210000006 HC OR PH I REC 0.5 TO 1 HR: Performed by: SURGERY

## 2021-10-13 PROCEDURE — 77030040361 HC SLV COMPR DVT MDII -B: Performed by: SURGERY

## 2021-10-13 PROCEDURE — 77030003578 HC NDL INSUF VERES AMR -B: Performed by: SURGERY

## 2021-10-13 PROCEDURE — 74011000250 HC RX REV CODE- 250: Performed by: NURSE ANESTHETIST, CERTIFIED REGISTERED

## 2021-10-13 PROCEDURE — 77030016151 HC PROTCTR LNS DFOG COVD -B: Performed by: SURGERY

## 2021-10-13 PROCEDURE — 76010000933 HC OR TIME 0.5 TO 1HR INTENSV - TIER 2: Performed by: SURGERY

## 2021-10-13 PROCEDURE — 77030010507 HC ADH SKN DERMBND J&J -B: Performed by: SURGERY

## 2021-10-13 PROCEDURE — 76060000032 HC ANESTHESIA 0.5 TO 1 HR: Performed by: SURGERY

## 2021-10-13 PROCEDURE — 77030022704 HC SUT VLOC COVD -B: Performed by: SURGERY

## 2021-10-13 PROCEDURE — 74011250637 HC RX REV CODE- 250/637: Performed by: ANESTHESIOLOGY

## 2021-10-13 PROCEDURE — 74011250636 HC RX REV CODE- 250/636: Performed by: ANESTHESIOLOGY

## 2021-10-13 PROCEDURE — 77030008683 HC TU ET CUF COVD -A: Performed by: NURSE ANESTHETIST, CERTIFIED REGISTERED

## 2021-10-13 PROCEDURE — 74011000250 HC RX REV CODE- 250: Performed by: SURGERY

## 2021-10-13 PROCEDURE — 77030020703 HC SEAL CANN DISP INTU -B: Performed by: SURGERY

## 2021-10-13 PROCEDURE — 74011250636 HC RX REV CODE- 250/636: Performed by: SURGERY

## 2021-10-13 PROCEDURE — 77030008477 HC STYL SATN SLP COVD -A: Performed by: NURSE ANESTHETIST, CERTIFIED REGISTERED

## 2021-10-13 PROCEDURE — 77030020782 HC GWN BAIR PAWS FLX 3M -B: Performed by: SURGERY

## 2021-10-13 PROCEDURE — 77030035277 HC OBTRTR BLDELSS DISP INTU -B: Performed by: SURGERY

## 2021-10-13 PROCEDURE — 74011250636 HC RX REV CODE- 250/636: Performed by: NURSE ANESTHETIST, CERTIFIED REGISTERED

## 2021-10-13 PROCEDURE — 76210000021 HC REC RM PH II 0.5 TO 1 HR: Performed by: SURGERY

## 2021-10-13 DEVICE — LAPAROSCOPIC SELF-FIXATING MESH POLYESTER WITH POLYLACTIC ACID GRIPS AND COLLAGEN FILM
Type: IMPLANTABLE DEVICE | Site: GROIN | Status: FUNCTIONAL
Brand: PROGRIP

## 2021-10-13 RX ORDER — SODIUM CHLORIDE, SODIUM LACTATE, POTASSIUM CHLORIDE, CALCIUM CHLORIDE 600; 310; 30; 20 MG/100ML; MG/100ML; MG/100ML; MG/100ML
125 INJECTION, SOLUTION INTRAVENOUS CONTINUOUS
Status: DISCONTINUED | OUTPATIENT
Start: 2021-10-13 | End: 2021-10-13 | Stop reason: HOSPADM

## 2021-10-13 RX ORDER — NEOSTIGMINE METHYLSULFATE 1 MG/ML
INJECTION, SOLUTION INTRAVENOUS AS NEEDED
Status: DISCONTINUED | OUTPATIENT
Start: 2021-10-13 | End: 2021-10-13 | Stop reason: HOSPADM

## 2021-10-13 RX ORDER — MIDAZOLAM HYDROCHLORIDE 1 MG/ML
INJECTION, SOLUTION INTRAMUSCULAR; INTRAVENOUS AS NEEDED
Status: DISCONTINUED | OUTPATIENT
Start: 2021-10-13 | End: 2021-10-13 | Stop reason: HOSPADM

## 2021-10-13 RX ORDER — OXYCODONE AND ACETAMINOPHEN 5; 325 MG/1; MG/1
1 TABLET ORAL
Qty: 20 TABLET | Refills: 0 | Status: SHIPPED | OUTPATIENT
Start: 2021-10-13 | End: 2021-10-18

## 2021-10-13 RX ORDER — DEXTROSE 50 % IN WATER (D50W) INTRAVENOUS SYRINGE
25-50 AS NEEDED
Status: DISCONTINUED | OUTPATIENT
Start: 2021-10-13 | End: 2021-10-13 | Stop reason: HOSPADM

## 2021-10-13 RX ORDER — MAGNESIUM SULFATE 100 %
4 CRYSTALS MISCELLANEOUS AS NEEDED
Status: DISCONTINUED | OUTPATIENT
Start: 2021-10-13 | End: 2021-10-13 | Stop reason: HOSPADM

## 2021-10-13 RX ORDER — PROPOFOL 10 MG/ML
INJECTION, EMULSION INTRAVENOUS AS NEEDED
Status: DISCONTINUED | OUTPATIENT
Start: 2021-10-13 | End: 2021-10-13 | Stop reason: HOSPADM

## 2021-10-13 RX ORDER — BUPIVACAINE HYDROCHLORIDE 2.5 MG/ML
INJECTION, SOLUTION EPIDURAL; INFILTRATION; INTRACAUDAL AS NEEDED
Status: DISCONTINUED | OUTPATIENT
Start: 2021-10-13 | End: 2021-10-13 | Stop reason: HOSPADM

## 2021-10-13 RX ORDER — INSULIN LISPRO 100 [IU]/ML
INJECTION, SOLUTION INTRAVENOUS; SUBCUTANEOUS ONCE
Status: DISCONTINUED | OUTPATIENT
Start: 2021-10-13 | End: 2021-10-13 | Stop reason: HOSPADM

## 2021-10-13 RX ORDER — ALBUTEROL SULFATE 0.83 MG/ML
2.5 SOLUTION RESPIRATORY (INHALATION) AS NEEDED
Status: DISCONTINUED | OUTPATIENT
Start: 2021-10-13 | End: 2021-10-13 | Stop reason: HOSPADM

## 2021-10-13 RX ORDER — SODIUM CHLORIDE 0.9 % (FLUSH) 0.9 %
5-40 SYRINGE (ML) INJECTION EVERY 8 HOURS
Status: DISCONTINUED | OUTPATIENT
Start: 2021-10-13 | End: 2021-10-13 | Stop reason: HOSPADM

## 2021-10-13 RX ORDER — HYDROCODONE BITARTRATE AND ACETAMINOPHEN 5; 325 MG/1; MG/1
1 TABLET ORAL AS NEEDED
Status: DISCONTINUED | OUTPATIENT
Start: 2021-10-13 | End: 2021-10-13 | Stop reason: HOSPADM

## 2021-10-13 RX ORDER — SODIUM CHLORIDE, SODIUM LACTATE, POTASSIUM CHLORIDE, CALCIUM CHLORIDE 600; 310; 30; 20 MG/100ML; MG/100ML; MG/100ML; MG/100ML
150 INJECTION, SOLUTION INTRAVENOUS CONTINUOUS
Status: DISCONTINUED | OUTPATIENT
Start: 2021-10-13 | End: 2021-10-13 | Stop reason: HOSPADM

## 2021-10-13 RX ORDER — GLYCOPYRROLATE 0.2 MG/ML
INJECTION INTRAMUSCULAR; INTRAVENOUS AS NEEDED
Status: DISCONTINUED | OUTPATIENT
Start: 2021-10-13 | End: 2021-10-13 | Stop reason: HOSPADM

## 2021-10-13 RX ORDER — SODIUM CHLORIDE 0.9 % (FLUSH) 0.9 %
5-40 SYRINGE (ML) INJECTION AS NEEDED
Status: DISCONTINUED | OUTPATIENT
Start: 2021-10-13 | End: 2021-10-13 | Stop reason: HOSPADM

## 2021-10-13 RX ORDER — LIDOCAINE HYDROCHLORIDE 20 MG/ML
INJECTION, SOLUTION EPIDURAL; INFILTRATION; INTRACAUDAL; PERINEURAL AS NEEDED
Status: DISCONTINUED | OUTPATIENT
Start: 2021-10-13 | End: 2021-10-13 | Stop reason: HOSPADM

## 2021-10-13 RX ORDER — DEXAMETHASONE SODIUM PHOSPHATE 4 MG/ML
INJECTION, SOLUTION INTRA-ARTICULAR; INTRALESIONAL; INTRAMUSCULAR; INTRAVENOUS; SOFT TISSUE AS NEEDED
Status: DISCONTINUED | OUTPATIENT
Start: 2021-10-13 | End: 2021-10-13 | Stop reason: HOSPADM

## 2021-10-13 RX ORDER — ONDANSETRON 2 MG/ML
4 INJECTION INTRAMUSCULAR; INTRAVENOUS ONCE
Status: DISCONTINUED | OUTPATIENT
Start: 2021-10-13 | End: 2021-10-13 | Stop reason: HOSPADM

## 2021-10-13 RX ORDER — FENTANYL CITRATE 50 UG/ML
25 INJECTION, SOLUTION INTRAMUSCULAR; INTRAVENOUS
Status: DISCONTINUED | OUTPATIENT
Start: 2021-10-13 | End: 2021-10-13 | Stop reason: HOSPADM

## 2021-10-13 RX ORDER — DIPHENHYDRAMINE HYDROCHLORIDE 50 MG/ML
12.5 INJECTION, SOLUTION INTRAMUSCULAR; INTRAVENOUS
Status: DISCONTINUED | OUTPATIENT
Start: 2021-10-13 | End: 2021-10-13 | Stop reason: HOSPADM

## 2021-10-13 RX ORDER — ROCURONIUM BROMIDE 10 MG/ML
INJECTION, SOLUTION INTRAVENOUS AS NEEDED
Status: DISCONTINUED | OUTPATIENT
Start: 2021-10-13 | End: 2021-10-13 | Stop reason: HOSPADM

## 2021-10-13 RX ORDER — HYDROMORPHONE HYDROCHLORIDE 1 MG/ML
0.2 INJECTION, SOLUTION INTRAMUSCULAR; INTRAVENOUS; SUBCUTANEOUS AS NEEDED
Status: DISCONTINUED | OUTPATIENT
Start: 2021-10-13 | End: 2021-10-13 | Stop reason: HOSPADM

## 2021-10-13 RX ORDER — KETOROLAC TROMETHAMINE 15 MG/ML
INJECTION, SOLUTION INTRAMUSCULAR; INTRAVENOUS AS NEEDED
Status: DISCONTINUED | OUTPATIENT
Start: 2021-10-13 | End: 2021-10-13 | Stop reason: HOSPADM

## 2021-10-13 RX ORDER — ONDANSETRON 2 MG/ML
INJECTION INTRAMUSCULAR; INTRAVENOUS AS NEEDED
Status: DISCONTINUED | OUTPATIENT
Start: 2021-10-13 | End: 2021-10-13 | Stop reason: HOSPADM

## 2021-10-13 RX ORDER — FENTANYL CITRATE 50 UG/ML
INJECTION, SOLUTION INTRAMUSCULAR; INTRAVENOUS AS NEEDED
Status: DISCONTINUED | OUTPATIENT
Start: 2021-10-13 | End: 2021-10-13 | Stop reason: HOSPADM

## 2021-10-13 RX ORDER — NALOXONE HYDROCHLORIDE 0.4 MG/ML
0.1 INJECTION, SOLUTION INTRAMUSCULAR; INTRAVENOUS; SUBCUTANEOUS AS NEEDED
Status: DISCONTINUED | OUTPATIENT
Start: 2021-10-13 | End: 2021-10-13 | Stop reason: HOSPADM

## 2021-10-13 RX ORDER — SODIUM CHLORIDE, SODIUM LACTATE, POTASSIUM CHLORIDE, CALCIUM CHLORIDE 600; 310; 30; 20 MG/100ML; MG/100ML; MG/100ML; MG/100ML
50 INJECTION, SOLUTION INTRAVENOUS CONTINUOUS
Status: DISCONTINUED | OUTPATIENT
Start: 2021-10-13 | End: 2021-10-13 | Stop reason: HOSPADM

## 2021-10-13 RX ADMIN — ONDANSETRON HYDROCHLORIDE 4 MG: 2 INJECTION INTRAMUSCULAR; INTRAVENOUS at 14:45

## 2021-10-13 RX ADMIN — DEXAMETHASONE SODIUM PHOSPHATE 4 MG: 4 INJECTION, SOLUTION INTRAMUSCULAR; INTRAVENOUS at 14:42

## 2021-10-13 RX ADMIN — KETOROLAC TROMETHAMINE 15 MG: 15 INJECTION, SOLUTION INTRAMUSCULAR; INTRAVENOUS at 15:03

## 2021-10-13 RX ADMIN — GLYCOPYRROLATE 0.4 MG: 0.2 INJECTION INTRAMUSCULAR; INTRAVENOUS at 15:06

## 2021-10-13 RX ADMIN — SODIUM CHLORIDE, SODIUM LACTATE, POTASSIUM CHLORIDE, AND CALCIUM CHLORIDE 50 ML/HR: 600; 310; 30; 20 INJECTION, SOLUTION INTRAVENOUS at 12:00

## 2021-10-13 RX ADMIN — HYDROCODONE BITARTRATE AND ACETAMINOPHEN 1 TABLET: 5; 325 TABLET ORAL at 16:24

## 2021-10-13 RX ADMIN — LIDOCAINE HYDROCHLORIDE 100 MG: 20 INJECTION, SOLUTION INTRAVENOUS at 14:24

## 2021-10-13 RX ADMIN — CEFOXITIN SODIUM 2 G: 2 POWDER, FOR SOLUTION INTRAVENOUS at 14:20

## 2021-10-13 RX ADMIN — SODIUM CHLORIDE, SODIUM LACTATE, POTASSIUM CHLORIDE, AND CALCIUM CHLORIDE 125 ML/HR: 600; 310; 30; 20 INJECTION, SOLUTION INTRAVENOUS at 11:50

## 2021-10-13 RX ADMIN — FENTANYL CITRATE 100 MCG: 50 INJECTION, SOLUTION INTRAMUSCULAR; INTRAVENOUS at 14:24

## 2021-10-13 RX ADMIN — ROCURONIUM BROMIDE 40 MG: 10 INJECTION, SOLUTION INTRAVENOUS at 14:27

## 2021-10-13 RX ADMIN — PROPOFOL 150 MG: 10 INJECTION, EMULSION INTRAVENOUS at 14:27

## 2021-10-13 RX ADMIN — Medication 3 MG: at 15:06

## 2021-10-13 RX ADMIN — FENTANYL CITRATE 25 MCG: 50 INJECTION INTRAMUSCULAR; INTRAVENOUS at 15:54

## 2021-10-13 RX ADMIN — MIDAZOLAM 2 MG: 1 INJECTION INTRAMUSCULAR; INTRAVENOUS at 14:18

## 2021-10-13 RX ADMIN — FENTANYL CITRATE 25 MCG: 50 INJECTION INTRAMUSCULAR; INTRAVENOUS at 15:43

## 2021-10-13 NOTE — ANESTHESIA PREPROCEDURE EVALUATION
Relevant Problems   No relevant active problems       Anesthetic History   No history of anesthetic complications            Review of Systems / Medical History  Patient summary reviewed, nursing notes reviewed and pertinent labs reviewed    Pulmonary            Asthma : well controlled       Neuro/Psych   Within defined limits           Cardiovascular                  Exercise tolerance: >4 METS     GI/Hepatic/Renal  Within defined limits              Endo/Other  Within defined limits           Other Findings              Physical Exam    Airway  Mallampati: II  TM Distance: 4 - 6 cm  Neck ROM: normal range of motion   Mouth opening: Normal     Cardiovascular  Regular rate and rhythm,  S1 and S2 normal,  no murmur, click, rub, or gallop             Dental  No notable dental hx       Pulmonary  Breath sounds clear to auscultation               Abdominal  GI exam deferred       Other Findings            Anesthetic Plan    ASA: 2  Anesthesia type: general          Induction: Intravenous  Anesthetic plan and risks discussed with: Patient

## 2021-10-13 NOTE — INTERVAL H&P NOTE
Update History & Physical    The Patient's History and Physical of October 13,    was reviewed with the patient and I examined the patient. There was no change. The surgical site was confirmed by the patient and me. Plan:  The risk, benefits, expected outcome, and alternative to the recommended procedure have been discussed with the patient. Patient understands and wants to proceed with the procedure.     Electronically signed by Trace Granger MD on 10/13/2021 at 12:30 PM

## 2021-10-13 NOTE — PERIOP NOTES
Tolerating po fluids  - talking with wife - FLACC- 0 , pts wife states \" he's in pain \"- pt relaxed without grimace, tolerating po fluids, resting intermittently with eyes closed -

## 2021-10-13 NOTE — PERIOP NOTES
Tolerating po fluids - c/o abd pain 3/10 -medicated with Norco 5/325 mg po x 1 - vss - will plan for discharge

## 2021-10-13 NOTE — DISCHARGE INSTRUCTIONS
Post-Operative Discharge Instructions  Yahir Johansen. Fernando Nick M.D.  [address] [place] [address], Earle Hayes  [phone]    Patient: Robyn Peter MRN: [id number]  CSN: [id number]    YOB: 1950  Age: 73 y.o. Sex: male    DOA: 10/13/2021 LOS:  LOS: 0 days   Discharge Date:      Acute Diagnoses:  LEFT RECURRENT INGUINAL HERNIA    Chronic Medical Diagnoses:  Problem List as of 10/13/2021 Date Reviewed: 10/13/2021        Codes Class Noted - Resolved    Syncope and collapse ICD-10-CM: R55  ICD-9-CM: 780.2  1/14/2014 - Present        Rotator cuff disorder ICD-10-CM: M67.919  ICD-9-CM: 726.10  1/14/2014 - Present    Overview Signed 1/14/2014  3:20 PM by Matthew Solano MD     Recent rotator cuff repair             Chest pain, unspecified ICD-10-CM: R07.9  ICD-9-CM: 786.50  7/22/2013 - Present        Palpitation ICD-10-CM: R00.2  ICD-9-CM: 785.1  7/22/2013 - Present        BPH (benign prostatic hyperplasia) ICD-10-CM: N40.0  ICD-9-CM: 600.00  7/22/2013 - Present              Diet  1. Resume prior to surgery diet as tolerated. Activity  1. Do not drive a car or operate any hazardous machinery the day of surgery. 2. Rest quietly today. 3. No bending or heavy lifting. 4. You may resume other prior to surgery activities as tolerated. 5. You may remove the bandage and shower in 1 day. Drain / Wound Care  1. Follow all drain / wound care instructions exactly as explained by the Nurse at time of discharge. 2. Apply an ice pack to the surgical site for 48 hours. 3. Do not put any salves or ointments on the wound. Allow it to form a dry scab. 4. Leave steri-strips / Dermabond alone. They should be allowed to fall off on their own in 7-14 days. Medications  1. It is important to take your medications exactly as they are prescribed.   2. Keep your medication in the bottles provided by the pharmacist, and keep a list of the medication names, dosages, and times they should be taken in your wallet. Call 911 anytime you think you may need emergency care. For example, call if:  · You passed out (lost consciousness). · You have severe trouble breathing. · You have sudden chest pain and shortness of breath. Notify your Surgeon for any of the followin. Fever, chills, nausea, vomiting, severe abdominal pain or bleeding. 2. If you experience any redness or discharge or sign of infection. 3. Persistent nausea lasting more than 24 hours. If you are unable to reach your Surgeon for any of the symptoms above, you should proceed directly to the nearest Emergency Department. Post-Operative Appointment Information    Call Dr. Huma Adam office tomorrow morning at [phone] to schedule a post-operative office visit in one (1) week. If any questions or concerns arise, call your Surgeon at [phone]. DISCHARGE SUMMARY from Nurse    PATIENT INSTRUCTIONS:    After general anesthesia or intravenous sedation, for 24 hours or while taking prescription Narcotics:  · Limit your activities  · Do not drive and operate hazardous machinery  · Do not make important personal or business decisions  · Do  not drink alcoholic beverages  · If you have not urinated within 8 hours after discharge, please contact your surgeon on call. Report the following to your surgeon:  · Excessive pain, swelling, redness or odor of or around the surgical area  · Temperature over 100.5  · Nausea and vomiting lasting longer than 4 hours or if unable to take medications  · Any signs of decreased circulation or nerve impairment to extremity: change in color, persistent  numbness, tingling, coldness or increase pain  · Any questions    What to do at Home:  Recommended activity: Ambulate in house, No driving while on analgesics and No heavy lifting until advised    If you experience any of the following symptoms above, please follow up with Dr. Jer Grayson.     *  Please give a list of your current medications to your Primary Care Provider. *  Please update this list whenever your medications are discontinued, doses are      changed, or new medications (including over-the-counter products) are added. *  Please carry medication information at all times in case of emergency situations. These are general instructions for a healthy lifestyle:    No smoking/ No tobacco products/ Avoid exposure to second hand smoke  Surgeon General's Warning:  Quitting smoking now greatly reduces serious risk to your health. Obesity, smoking, and sedentary lifestyle greatly increases your risk for illness    A healthy diet, regular physical exercise & weight monitoring are important for maintaining a healthy lifestyle    You may be retaining fluid if you have a history of heart failure or if you experience any of the following symptoms:  Weight gain of 3 pounds or more overnight or 5 pounds in a week, increased swelling in our hands or feet or shortness of breath while lying flat in bed. Please call your doctor as soon as you notice any of these symptoms; do not wait until your next office visit. Patient armband removed and shredded      The discharge information has been reviewed with the patient and caregiver. The patient and caregiver verbalized understanding. Discharge medications reviewed with the patient and caregiver and appropriate educational materials and side effects teaching were provided. Learning About COVID-19 and Social Distancing  What is it? Social distancing means putting space between yourself and other people. The recommended distance is 6 feet, or about 2 meters. This also means staying away from any place where people may gather, such as hui or other public gathering places. Why is it important? Social distancing is the best way to reduce the spread of COVID-19. This virus seems to spread from person to person through droplets from coughing and sneezing.  So if you keep your distance from others, you're less likely to get it or spread it. And social distancing is important for everyone, not just those who are at high risk of infection, like older people. You might have the virus but not have symptoms. You could then give the infection to someone you come into contact with. How is it done? Putting 6 feet, or about 2 meters, between you and other people is the recommended distance. Also stay away from any place where people may gather, such as hui or other public gathering places. So if possible:  · Work from home, and keep your kids at home. · Don't travel if you don't have to. And avoid public transportation, ride-shares, and taxis unless you have no choice. · Limit shopping to essentials, like food and medicines. · Wear a cloth face cover if you have to go to a public place like the grocery store or pharmacy. · Don't eat in restaurants. (You can still get takeout or food deliveries.)  · Avoid crowds and busy places. Follow stay-at-home orders or other directions for your area. Where can you learn more? Go to http://www.gray.com/  Enter A133 in the search box to learn more about \"Learning About COVID-19 and Social Distancing. \"  Current as of: December 18, 2020               Content Version: 12.8  © 2137-9806 [employer]. Care instructions adapted under license by [employer] (which disclaims liability or warranty for this information). If you have questions about a medical condition or this instruction, always ask your healthcare professional. [address] any warranty or liability for your use of this information.     ___________________________________________________________________________________________________________________________________

## 2021-10-13 NOTE — PERIOP NOTES
Reviewed PTA medication list with patient/caregiver and patient/caregiver denies any additional medications. Patient admits to having a responsible adult care for them at home for at least 24 hours after surgery. Patient encouraged to use gown warming system and informed that using said warming gown to regulate body temperature prior to a procedure has been shown to help reduce the risks of blood clots and infection. Patient's pharmacy of choice verified and documented in PTA medication section. Dual skin assessment & fall risk band verification completed with CORDELL Dougherty.

## 2021-10-13 NOTE — BRIEF OP NOTE
Brief Postoperative Note    Patient: Yancy Brown  YOB: 1950  MRN: [id number]    Date of Procedure: 10/13/2021     Pre-Op Diagnosis: LEFT RECURRENT INGUINAL HERNIA    Post-Op Diagnosis: Same as preoperative diagnosis. Procedure(s):  ROBOTIC RECURRENT LEFT INGUINAL HERNIA REPAIR, NEURECTOMY LEFT ILIOINGUINAL NERVE    Surgeon(s): Estrella Mccormick MD    Surgical Assistant: Surg Asst-1: Kodak Cruz    Anesthesia: General     Estimated Blood Loss (mL): Minimal    Complications: None    Specimens: * No specimens in log *     Implants:   Implant Name Type Inv.  Item Serial No.  Lot No. LRB No. Used Action   MESH ABSORB SURG PROGRIP 10X15 -- PROGRIP - [id number]  MESH ABSORB SURG PROGRIP 10X15 -- [address] [id number] Left 1 Implanted       Drains: * No LDAs found *    Findings: [facility]    Electronically Signed by Neris Kaplan MD on 10/13/2021 at 3:05 PM

## 2021-10-13 NOTE — ANESTHESIA POSTPROCEDURE EVALUATION
Post-Anesthesia Evaluation and Assessment    Cardiovascular Function/Vital Signs  Visit Vitals  /62   Pulse 65   Temp 36.4 °C (97.5 °F)   Resp 14   Ht 5' 10\" (1.778 m)   Wt 91.4 kg (201 lb 8 oz)   SpO2 98%   BMI 28.91 kg/m²       Patient is status post Procedure(s):  ROBOTIC RECURRENT LEFT INGUINAL HERNIA REPAIR, NEURECTOMY LEFT ILIOINGUINAL NERVE. Nausea/Vomiting: Controlled. Postoperative hydration reviewed and adequate. Pain:  Pain Scale 1: Numeric (0 - 10) (10/13/21 1557)  Pain Intensity 1: 2 (Pt stated pain improving) (10/13/21 1557)   Managed. Neurological Status:   Neuro (WDL): Within Defined Limits (10/13/21 1545)   At baseline. Mental Status and Level of Consciousness: Baseline and appropriate for discharge. Pulmonary Status:   O2 Device: Nasal cannula (10/13/21 1545)   Adequate oxygenation and airway patent. Complications related to anesthesia: None    Post-anesthesia assessment completed. No concerns. Patient has met all discharge requirements.     Signed By: Litzy Hickman CRNA    October 13, 2021

## 2021-10-14 NOTE — OP NOTES
[facility] [place]  OPERATIVE REPORT    Name:  Ramin Case  MR#:   [id number]  :  1950  ACCOUNT #:  [de-identified]  DATE OF SERVICE:  10/13/2021    PREOPERATIVE DIAGNOSIS:  Recurrent left inguinal hernia. POSTOPERATIVE DIAGNOSES:  1. Recurrent left inguinal hernia. 2.  Neuroma. PROCEDURE PERFORMED:  1.  Robotic repair of recurrent left inguinal hernia. 2.  Neurectomy with implantation. SURGEON:  Kaur Smith MD    ASSISTANT:  None. ANESTHESIA:  General endotracheal.    COMPLICATIONS:  None. SPECIMENS REMOVED:  None. IMPLANTS:  None. ESTIMATED BLOOD LOSS:  Minimal.    INDICATIONS:  This is a patient who presents with a symptomatic recurrent left inguinal hernia. He will undergo repair. I have discussed risks, benefits, and alternatives to him including the risk of bleeding, infection, re-operation, recurrence, use of mesh, possible removal of mesh, possible loss of testicle, possible bowel and bladder injury, possible neurectomy for chronic pain. He understands and wishes to proceed. PROCEDURE:  He was placed in supine position. His abdomen was prepped and draped in the usual fashion. A Veress needle was inserted in the left upper quadrant using one-drop technique and pneumoperitoneum was established. Next, an 8-mm robotic port was placed in the supraumbilical position under visualization. An 8-mm AirSeal port was placed in the epigastrium as a working port. Two 8 mm robotic ports were placed, one on the right side of the abdomen and one on the left side of the abdomen, under visualization. The abdomen was inspected and the patient was found to have a recurrent left inguinal hernia. He had a direct defect. The peritoneum on the left side was taken down and a peritoneal flap was established on the left side using the monopolar scissors. After an adequate flap was established, all critical views and spaces were identified and confirmed. No previous mesh was seen. After dissection of the peritoneal flap and scar from his previous repair, it was noted that the ilioinguinal nerve was possibly exposed and to prevent chronic pain, as was discussed with the patient preoperatively, a neurectomy was done proximally and distally using the monopolar scissors and the proximal nerve was embedded into the muscle tissue. Next, the direct defect was reduced and the peritoneum and sac were dissected as far inferior as possible. There was good hemostasis. Laparoscopic ProGrip mesh was placed over the inguinal floor and allowed to self-. There was good overlay and overlap of the mesh. Peritoneal flap was closed with a 2-0 PDS V-Loc continuous suture. All ports were removed. Skin incisions were closed with 4-0 Monocryl subcuticular closure and Dermabond.       Minda Lua MD      SH/[email or web address]/[email or web address]_P  D:  10/13/2021 15:55  T:  10/14/2021 0:43  JOB #:  [id number]

## 2023-06-14 RX ORDER — MONTELUKAST SODIUM 10 MG/1
10 TABLET ORAL AS NEEDED
COMMUNITY

## 2023-06-14 RX ORDER — CEPHALEXIN 500 MG/1
500 CAPSULE ORAL 3 TIMES DAILY
Qty: 21 CAPSULE | Refills: 0 | COMMUNITY
Start: 2023-06-11 | End: 2023-06-18

## 2023-06-14 NOTE — PERIOP NOTE
PAT - SURGICAL PRE-ADMISSION INSTRUCTIONS        Do NOT eat or drink anything, including candy or gum, after MIDNIGHT on 6/15/2023 , unless you have specific instructions from your Surgeon or Anesthesia Provider to do so. No smoking 24 hours before surgery. No alcohol 24 hours prior to the day of surgery. No recreational drugs for one week prior to the day of surgery. Leave all valuables, including money/purse, at home. Remove all jewelry, nail polish, makeup (including mascara); no lotions, powders, deodorant, or perfume/cologne/after shave. Glasses/Contact lenses and Dentures may be worn to the hospital.  They will be removed prior to surgery. Call your doctor if symptoms of a cold or illness develop within 24 ours prior to surgery. AN ADULT MUST DRIVE YOU HOME AFTER OUTPATIENT SURGERY. If you are having an OUTPATIENT procedure, please make arrangements for a responsible adult to be with you for 24 hours after your surgery. If you are admitted to the hospital, you will be assigned to a bed after surgery is complete. Normally a family member will not be able to see you until you are in your assigned bed. 12. Visitation Policy Explained    Special Instructions:  Pt instructed to hold vitamins/supplements 2 wks prior to procedure  Follow all verbal or written instructions given by your surgeon's office. Patient Prep:  CHG wipes given.

## 2023-06-14 NOTE — H&P
Urology [address] [place] [address] [address] [address]  Tel: [phone]  Fax: [phone]    Patient : Janice Lloyd   YOB: 1950   Birth Sex: Male      Current Gender: Male      Date:  06/13/2023 2:00 PM    Visit Type:   Office Visit   Assessment/Plan  # Detail Type Description    1. Assessment Hydronephrosis with renal and ureteral calculous obstruction (N13.2). Patient Plan Patient with 7 mm left mid to distal ureteral stone causing hydronephrosis and severe pain. We reviewed treatment options reviewed video on ureteroscopy will schedule for ureteroscopic stone extraction with laser fragmentation and stent placement risks reviewed pain infection bleeding ureteral injury need for stent will schedule this as soon as possible. 2. Assessment Elevated prostate specific antigen [PSA] (R97.20). 3. Assessment Enlarged prostate w/ LUTS (N40.1). 4. Assessment Feeling of incomplete bladder emptying (R39.14). 5. Assessment Frequency of micturition (R35.0). Additional Visit Information      This 72year old male presents for Elevated PSA, Hypogonadism, BPH, Blood in Urine and Kidney and/or Ureteral Stone. History of Present Illness  1. Elevated PSA   The onset of symptoms was gradual. The problem has improved. Severity level is described as moderate. Pertinent history includes age over 48 and BPH. Associated symptoms include hematuria, nocturia and urinary frequency. Pertinent negatives include slow stream and urinary urgency. Additional information: the patient is sexually active and Pt is using Jayln. PSA has increaed to 10.84. He has had 2 biopsies previously. .           Comments: 10/23/19  Pt with hx of rising PSA, MRI of prostate this reveals no evidence of target lesion. He will repeat labs 6 months  1/17/20  Pt will f/u 4 mo PSA,  05/19/2020  Patient with a history of elevated PSA.   It had increased over 10 at his last

## 2023-06-15 ENCOUNTER — APPOINTMENT (OUTPATIENT)
Facility: HOSPITAL | Age: 73
End: 2023-06-15
Attending: UROLOGY
Payer: COMMERCIAL

## 2023-06-15 ENCOUNTER — HOSPITAL ENCOUNTER (OUTPATIENT)
Facility: HOSPITAL | Age: 73
Setting detail: OUTPATIENT SURGERY
Discharge: HOME | End: 2023-06-15
Attending: UROLOGY | Admitting: UROLOGY
Payer: COMMERCIAL

## 2023-06-15 VITALS
HEIGHT: 70 IN | WEIGHT: 198 LBS | BODY MASS INDEX: 28.35 KG/M2 | DIASTOLIC BLOOD PRESSURE: 67 MMHG | SYSTOLIC BLOOD PRESSURE: 154 MMHG | OXYGEN SATURATION: 98 % | TEMPERATURE: 97.5 F | HEART RATE: 65 BPM | RESPIRATION RATE: 16 BRPM

## 2023-06-15 PROCEDURE — 74420 UROGRAPHY RTRGR +-KUB: CPT

## 2023-06-15 PROCEDURE — 3700000001 HC ADD 15 MINUTES (ANESTHESIA): Performed by: UROLOGY

## 2023-06-15 PROCEDURE — 2709999900 HC NON-CHARGEABLE SUPPLY: Performed by: UROLOGY

## 2023-06-15 PROCEDURE — 7100000011 HC PHASE II RECOVERY - ADDTL 15 MIN: Performed by: UROLOGY

## 2023-06-15 PROCEDURE — C1769 GUIDE WIRE: HCPCS | Performed by: UROLOGY

## 2023-06-15 PROCEDURE — 7100000001 HC PACU RECOVERY - ADDTL 15 MIN: Performed by: UROLOGY

## 2023-06-15 PROCEDURE — 3700000000 HC ANESTHESIA ATTENDED CARE: Performed by: UROLOGY

## 2023-06-15 PROCEDURE — 6360000004 HC RX CONTRAST MEDICATION: Performed by: UROLOGY

## 2023-06-15 PROCEDURE — 2580000003 HC RX 258: Performed by: UROLOGY

## 2023-06-15 PROCEDURE — 7100000000 HC PACU RECOVERY - FIRST 15 MIN: Performed by: UROLOGY

## 2023-06-15 PROCEDURE — C1758 CATHETER, URETERAL: HCPCS | Performed by: UROLOGY

## 2023-06-15 PROCEDURE — 6360000002 HC RX W HCPCS: Performed by: ANESTHESIOLOGY

## 2023-06-15 PROCEDURE — 2720000010 HC SURG SUPPLY STERILE: Performed by: UROLOGY

## 2023-06-15 PROCEDURE — C2617 STENT, NON-COR, TEM W/O DEL: HCPCS | Performed by: UROLOGY

## 2023-06-15 PROCEDURE — 7100000010 HC PHASE II RECOVERY - FIRST 15 MIN: Performed by: UROLOGY

## 2023-06-15 PROCEDURE — 3600000002 HC SURGERY LEVEL 2 BASE: Performed by: UROLOGY

## 2023-06-15 PROCEDURE — 82360 CALCULUS ASSAY QUANT: CPT

## 2023-06-15 PROCEDURE — 3600000012 HC SURGERY LEVEL 2 ADDTL 15MIN: Performed by: UROLOGY

## 2023-06-15 DEVICE — URETERAL STENT
Type: IMPLANTABLE DEVICE | Site: URETHRA | Status: FUNCTIONAL
Brand: POLARIS™ ULTRA

## 2023-06-15 RX ORDER — PROCHLORPERAZINE EDISYLATE 5 MG/ML
5 INJECTION INTRAMUSCULAR; INTRAVENOUS
Status: DISCONTINUED | OUTPATIENT
Start: 2023-06-15 | End: 2023-06-15 | Stop reason: HOSPADM

## 2023-06-15 RX ORDER — LEVOFLOXACIN 5 MG/ML
500 INJECTION, SOLUTION INTRAVENOUS
Status: COMPLETED | OUTPATIENT
Start: 2023-06-15 | End: 2023-06-15

## 2023-06-15 RX ORDER — IPRATROPIUM BROMIDE AND ALBUTEROL SULFATE 2.5; .5 MG/3ML; MG/3ML
1 SOLUTION RESPIRATORY (INHALATION)
Status: DISCONTINUED | OUTPATIENT
Start: 2023-06-15 | End: 2023-06-15 | Stop reason: HOSPADM

## 2023-06-15 RX ORDER — SODIUM CHLORIDE, SODIUM LACTATE, POTASSIUM CHLORIDE, CALCIUM CHLORIDE 600; 310; 30; 20 MG/100ML; MG/100ML; MG/100ML; MG/100ML
INJECTION, SOLUTION INTRAVENOUS CONTINUOUS
Status: DISCONTINUED | OUTPATIENT
Start: 2023-06-15 | End: 2023-06-15 | Stop reason: HOSPADM

## 2023-06-15 RX ORDER — LABETALOL HYDROCHLORIDE 5 MG/ML
10 INJECTION, SOLUTION INTRAVENOUS
Status: DISCONTINUED | OUTPATIENT
Start: 2023-06-15 | End: 2023-06-15 | Stop reason: HOSPADM

## 2023-06-15 RX ORDER — FENTANYL CITRATE 50 UG/ML
50 INJECTION, SOLUTION INTRAMUSCULAR; INTRAVENOUS
Status: COMPLETED | OUTPATIENT
Start: 2023-06-15 | End: 2023-06-15

## 2023-06-15 RX ORDER — ONDANSETRON 2 MG/ML
INJECTION INTRAMUSCULAR; INTRAVENOUS
Status: COMPLETED
Start: 2023-06-15 | End: 2023-06-15

## 2023-06-15 RX ORDER — ONDANSETRON 2 MG/ML
4 INJECTION INTRAMUSCULAR; INTRAVENOUS ONCE
Status: COMPLETED | OUTPATIENT
Start: 2023-06-15 | End: 2023-06-15

## 2023-06-15 RX ORDER — SODIUM CHLORIDE 0.9 % (FLUSH) 0.9 %
5-40 SYRINGE (ML) INJECTION PRN
Status: DISCONTINUED | OUTPATIENT
Start: 2023-06-15 | End: 2023-06-15 | Stop reason: HOSPADM

## 2023-06-15 RX ORDER — DIPHENHYDRAMINE HYDROCHLORIDE 50 MG/ML
12.5 INJECTION INTRAMUSCULAR; INTRAVENOUS
Status: DISCONTINUED | OUTPATIENT
Start: 2023-06-15 | End: 2023-06-15 | Stop reason: HOSPADM

## 2023-06-15 RX ORDER — SODIUM CHLORIDE 0.9 % (FLUSH) 0.9 %
5-40 SYRINGE (ML) INJECTION EVERY 12 HOURS SCHEDULED
Status: DISCONTINUED | OUTPATIENT
Start: 2023-06-15 | End: 2023-06-15 | Stop reason: HOSPADM

## 2023-06-15 RX ORDER — FENTANYL CITRATE 50 UG/ML
25 INJECTION, SOLUTION INTRAMUSCULAR; INTRAVENOUS EVERY 5 MIN PRN
Status: DISCONTINUED | OUTPATIENT
Start: 2023-06-15 | End: 2023-06-15 | Stop reason: HOSPADM

## 2023-06-15 RX ORDER — HYDROMORPHONE HYDROCHLORIDE 1 MG/ML
0.5 INJECTION, SOLUTION INTRAMUSCULAR; INTRAVENOUS; SUBCUTANEOUS EVERY 5 MIN PRN
Status: DISCONTINUED | OUTPATIENT
Start: 2023-06-15 | End: 2023-06-15 | Stop reason: HOSPADM

## 2023-06-15 RX ORDER — SODIUM CHLORIDE 9 MG/ML
INJECTION, SOLUTION INTRAVENOUS PRN
Status: DISCONTINUED | OUTPATIENT
Start: 2023-06-15 | End: 2023-06-15 | Stop reason: HOSPADM

## 2023-06-15 RX ORDER — OXYCODONE HYDROCHLORIDE 5 MG/1
5 TABLET ORAL
Status: DISCONTINUED | OUTPATIENT
Start: 2023-06-15 | End: 2023-06-15 | Stop reason: HOSPADM

## 2023-06-15 RX ORDER — ONDANSETRON 2 MG/ML
4 INJECTION INTRAMUSCULAR; INTRAVENOUS
Status: DISCONTINUED | OUTPATIENT
Start: 2023-06-15 | End: 2023-06-15 | Stop reason: HOSPADM

## 2023-06-15 RX ADMIN — FENTANYL CITRATE 50 MCG: 50 INJECTION, SOLUTION INTRAMUSCULAR; INTRAVENOUS at 11:29

## 2023-06-15 RX ADMIN — SODIUM CHLORIDE, SODIUM LACTATE, POTASSIUM CHLORIDE, AND CALCIUM CHLORIDE: 600; 310; 30; 20 INJECTION, SOLUTION INTRAVENOUS at 10:30

## 2023-06-15 RX ADMIN — ONDANSETRON 4 MG: 2 INJECTION INTRAMUSCULAR; INTRAVENOUS at 11:26

## 2023-06-15 ASSESSMENT — PAIN - FUNCTIONAL ASSESSMENT: PAIN_FUNCTIONAL_ASSESSMENT: 0-10

## 2023-06-15 ASSESSMENT — PAIN SCALES - GENERAL
PAINLEVEL_OUTOF10: 0

## 2023-06-15 NOTE — PERIOP NOTE
Anesthesia (Dr. Claudy Machado) notified for sign-out. Patient meets all requirements for transition to next phase of care.

## 2023-06-15 NOTE — OP NOTE
Operative Note      Patient: Mehrdad Perez  YOB: 1950  MRN: [id number]    Date of Procedure: 6/15/2023    Pre-Op Diagnosis Codes: * Hydronephrosis with renal and ureteral calculus obstruction [N13.2]    Post-Op Diagnosis: LEFT lower ureteral stone       Procedure(s):  CYSTOSCOPY LEFT RETROGRADE PYELOGRAM LEFT URETEROSCOPY THULIUM LASER LITHRIPSY STENT PLACEMENT W/C-ARM    Surgeon(s):  Agnes Goodman MD    Assistant:   * No surgical staff found *    Anesthesia: General    Estimated Blood Loss (mL): Minimal    Complications: None    Specimens:   ID Type Source Tests Collected by Time Destination   1 : LEFT KIDNEY STONE Stone (Calculus) Kidney STONE ANALYSIS Agnes Goodman MD 6/15/2023 1219        Implants:  Implant Name Type Inv. Item Serial No.  Lot No. LRB No. Used Action   STENT URET 5FR L26CM PERCFLX HYDR+ DBL PGTL THRD 2 - [id number]  STENT URET 5FR L26CM PERCFLX HYDR+ DBL PGTL THRD 2  [employer] [facility] UR[facility]- [id number] Left 1 Implanted         Drains: * No LDAs found *    Findings: left stone in lower ureter        Detailed Description of Procedure:   Procedure Details: The patient was brought in the operating room, placed in the   supine position. After administration of general anesthesia, she was placed   in lithotomy position. Groin and genitalia were prepped and draped in the   usual sterile fashion. I began with a 21-Macedonian cystoscope. Cystourethroscopy did not reveal any abnormalities. I identified the left  ureteral orifice and intubated it with a 5-Macedonian open-ended catheter. I   then performed a retrograde pyelogram, this revealed filling defect in lower ureter and proximal hydroureteronephrosis, then placed a 0.035 sensor wire   through the open-ended catheter up into the collecting system. I used   the 6.9-Macedonian semirigid ureteroscope, I traversed the ureter to the level of the stone  Using the thulium laser, I fragmented and dusted the stone completely.   All the

## 2023-06-15 NOTE — INTERVAL H&P NOTE
Update History & Physical    The patient's History and Physical of June 13, 2023 was reviewed with the patient and I examined the patient. There was no change. The surgical site was confirmed by the patient and me. Plan: The risks, benefits, expected outcome, and alternative to the recommended procedure have been discussed with the patient. Patient understands and wants to proceed with the procedure.      Electronically signed by Graciela Bishop MD on 6/15/2023 at 11:27 AM

## 2023-06-15 NOTE — PERIOP NOTE
TRANSFER - IN REPORT:    Verbal report received from [address] [place] [address] and [facility] 66. on Sharlene Jiménez being received from OR for routine post - op      Report consisted of patients Situation, Background, Assessment and   Recommendations(SBAR). Information from the following report(s) SBAR, Kardex, Procedure Summary, Intake/Output, and MAR was reviewed with the receiving nurse. Opportunity for questions and clarification was provided. Assessment completed upon patients arrival to unit and care assumed.

## 2023-06-15 NOTE — PERIOP NOTE
TRANSFER - IN REPORT:    Verbal report received from [facility] (name) on Brittany Johnson  being received from PACU (unit) for routine progression of patient care      Report consisted of patients Situation, Background, Assessment and   Recommendations(SBAR). Information from the following report(s) Nurse Handoff Report, Surgery Report, Intake/Output, and MAR was reviewed with the receiving nurse. Opportunity for questions and clarification was provided.       Assessment completed upon patients arrival to unit and care assume

## 2023-06-15 NOTE — DISCHARGE INSTRUCTIONS
Guy Rose. Skyler Howard M.D. [facility]  [address], [address] [place], [address]  Office: [phone]  Fax:    [phone]    PROCEDURE: Procedure(s):  CYSTOSCOPY LEFT RETROGRADE PYELOGRAM LEFT URETEROSCOPY THULIUM LASER [address] W/C-ARM    Notify [facility] IMMEDIATELY if any of the following occur: You are unable to urinate. Urgency to urinate is not uncommon. You find yourself urinating small frequent amounts associated with severe lower abdominal discomfort. Bright red blood with clots in the urine. Some reddish urine is not uncommon and should be treated with increasing the amount of fluids you drink. Temperature above 101.5° and / or chills. You are nauseous and / or vomiting and you cannot hold down any fluids. Your pain is not controlled with the pain medication prescribed. Special Considerations:     Do not drive for at least 24 hours after the procedure and until you are no longer taking narcotic pain medication and you are able to move and react without hesitation. MEDICATIONS:  Pain   [x]  Norco®   []  Percocet®  [] Dilaudid®    []  Tramadol  [x] Ketorolac   Antibiotics   []  Cipro   [x]  Keflex    [] Levaquin   []  Bactrim DS®      [] Cefuroxime   Urination   []  Vesicare®   []  Flomax      Burning   []  Pyridium®   []  UribelTM      Nausea   []  Zofran®   []  Phenergan®      Miscellaneous   []            [] Prescriptions Written on Chart    [x] Prescriptions sent Electronically to [address] [place] [address] office will call you tomorrow to schedule your first follow-up appointment. Please contact [place] [address] [facility] at [phone] or go to the nearest Emergency Department / Urgent Care facility for any other medical questions or concerns.    DISCHARGE SUMMARY from Nurse    PATIENT INSTRUCTIONS:    After general anesthesia or intravenous sedation, for 24 hours or while taking prescription Narcotics:  Limit your activities  Do not drive and operate

## 2023-06-15 NOTE — PERIOP NOTE
TRANSFER - OUT REPORT:    Verbal report given to [address] on Karissa Haddad  being transferred to Phase II for routine post-op       Report consisted of patient's Situation, Background, Assessment and   Recommendations(SBAR). Information from the following report(s) Nurse Handoff Report, Index, Adult Overview, Surgery Report, Intake/Output, MAR, Recent Results, Med Rec Status, and Cardiac Rhythm NSR W/ LBBB & occasional PVCs  was reviewed with the receiving nurse. Lines:   Peripheral IV 06/15/23 Posterior;Right Hand (Active)   Site Assessment Clean, dry & intact 06/15/23 1250   Line Status Flushed; Infusing 06/15/23 1250   Phlebitis Assessment No symptoms 06/15/23 1250   Infiltration Assessment 0 06/15/23 1250   Dressing Status Clean, dry & intact 06/15/23 1250   Dressing Type Transparent 06/15/23 1250        Opportunity for questions and clarification was provided.       Patient transported with:  Tech      Intake/Output Summary (Last 24 hours) at 6/15/2023 1322  Last data filed at 6/15/2023 1322  Gross per 24 hour   Intake 1800 ml   Output --   Net 1800 ml

## 2023-06-21 LAB
CALCIUM OXALATE DIHYDRATE MFR STONE IR: 70 %
COLOR STONE: NORMAL
COM MFR STONE: 30 %
LABORATORY COMMENT REPORT: NORMAL
LABORATORY COMMENT REPORT: NORMAL
Lab: NORMAL
Lab: NORMAL
PHOTO: NORMAL
SIZE STONE: NORMAL MM
SPECIMEN SOURCE: NORMAL
STONE COMPOSITION: NORMAL
WT STONE: 26 MG

## 2023-07-15 ENCOUNTER — HOSPITAL ENCOUNTER (EMERGENCY)
Facility: HOSPITAL | Age: 73
Discharge: HOME | End: 2023-07-15
Attending: EMERGENCY MEDICINE
Payer: COMMERCIAL

## 2023-07-15 ENCOUNTER — APPOINTMENT (OUTPATIENT)
Facility: HOSPITAL | Age: 73
End: 2023-07-15
Payer: COMMERCIAL

## 2023-07-15 VITALS
WEIGHT: 192 LBS | RESPIRATION RATE: 14 BRPM | DIASTOLIC BLOOD PRESSURE: 91 MMHG | BODY MASS INDEX: 28.44 KG/M2 | SYSTOLIC BLOOD PRESSURE: 129 MMHG | OXYGEN SATURATION: 98 % | TEMPERATURE: 98.1 F | HEART RATE: 57 BPM | HEIGHT: 69 IN

## 2023-07-15 DIAGNOSIS — R07.9 LEFT-SIDED CHEST PAIN: ICD-10-CM

## 2023-07-15 DIAGNOSIS — R10.12 ABDOMINAL PAIN, LEFT UPPER QUADRANT: Primary | ICD-10-CM

## 2023-07-15 LAB
ALBUMIN SERPL-MCNC: 3.8 G/DL (ref 3.4–5)
ALBUMIN/GLOB SERPL: 1.3 (ref 0.8–1.7)
ALP SERPL-CCNC: 90 U/L (ref 45–117)
ALT SERPL-CCNC: 31 U/L (ref 16–61)
ANION GAP SERPL CALC-SCNC: 7 MMOL/L (ref 3–18)
AST SERPL-CCNC: 20 U/L (ref 10–38)
BASOPHILS # BLD: 0.1 K/UL (ref 0–0.1)
BASOPHILS NFR BLD: 1 % (ref 0–2)
BILIRUB SERPL-MCNC: 0.4 MG/DL (ref 0.2–1)
BUN SERPL-MCNC: 18 MG/DL (ref 7–18)
BUN/CREAT SERPL: 17 (ref 12–20)
CALCIUM SERPL-MCNC: 9 MG/DL (ref 8.5–10.1)
CHLORIDE SERPL-SCNC: 112 MMOL/L (ref 100–111)
CO2 SERPL-SCNC: 24 MMOL/L (ref 21–32)
CREAT SERPL-MCNC: 1.07 MG/DL (ref 0.6–1.3)
D DIMER PPP FEU-MCNC: 0.47 UG/ML(FEU)
DIFFERENTIAL METHOD BLD: ABNORMAL
EKG ATRIAL RATE: 57 BPM
EKG ATRIAL RATE: 61 BPM
EKG DIAGNOSIS: NORMAL
EKG DIAGNOSIS: NORMAL
EKG P AXIS: 59 DEGREES
EKG P AXIS: 67 DEGREES
EKG P-R INTERVAL: 168 MS
EKG P-R INTERVAL: 180 MS
EKG Q-T INTERVAL: 452 MS
EKG Q-T INTERVAL: 476 MS
EKG QRS DURATION: 154 MS
EKG QRS DURATION: 156 MS
EKG QTC CALCULATION (BAZETT): 455 MS
EKG QTC CALCULATION (BAZETT): 463 MS
EKG R AXIS: -54 DEGREES
EKG R AXIS: 149 DEGREES
EKG T AXIS: -1 DEGREES
EKG T AXIS: 30 DEGREES
EKG VENTRICULAR RATE: 57 BPM
EKG VENTRICULAR RATE: 61 BPM
EOSINOPHIL # BLD: 0.2 K/UL (ref 0–0.4)
EOSINOPHIL NFR BLD: 2 % (ref 0–5)
ERYTHROCYTE [DISTWIDTH] IN BLOOD BY AUTOMATED COUNT: 13.2 % (ref 11.6–14.5)
GLOBULIN SER CALC-MCNC: 2.9 G/DL (ref 2–4)
GLUCOSE SERPL-MCNC: 115 MG/DL (ref 74–99)
HCT VFR BLD AUTO: 40.5 % (ref 36–48)
HGB BLD-MCNC: 13.6 G/DL (ref 13–16)
IMM GRANULOCYTES # BLD AUTO: 0 K/UL (ref 0–0.04)
IMM GRANULOCYTES NFR BLD AUTO: 0 % (ref 0–0.5)
LIPASE SERPL-CCNC: 104 U/L (ref 73–393)
LYMPHOCYTES # BLD: 0.8 K/UL (ref 0.9–3.6)
LYMPHOCYTES NFR BLD: 9 % (ref 21–52)
MCH RBC QN AUTO: 30 PG (ref 24–34)
MCHC RBC AUTO-ENTMCNC: 33.6 G/DL (ref 31–37)
MCV RBC AUTO: 89.2 FL (ref 78–100)
MONOCYTES # BLD: 0.7 K/UL (ref 0.05–1.2)
MONOCYTES NFR BLD: 7 % (ref 3–10)
NEUTS SEG # BLD: 7.6 K/UL (ref 1.8–8)
NEUTS SEG NFR BLD: 81 % (ref 40–73)
NRBC # BLD: 0 K/UL (ref 0–0.01)
NRBC BLD-RTO: 0 PER 100 WBC
PLATELET # BLD AUTO: 185 K/UL (ref 135–420)
PMV BLD AUTO: 9.4 FL (ref 9.2–11.8)
POTASSIUM SERPL-SCNC: 4.5 MMOL/L (ref 3.5–5.5)
PROT SERPL-MCNC: 6.7 G/DL (ref 6.4–8.2)
RBC # BLD AUTO: 4.54 M/UL (ref 4.35–5.65)
SODIUM SERPL-SCNC: 143 MMOL/L (ref 136–145)
TROPONIN I SERPL HS-MCNC: 11 NG/L (ref 0–78)
TROPONIN I SERPL HS-MCNC: 8 NG/L (ref 0–78)
WBC # BLD AUTO: 9.4 K/UL (ref 4.6–13.2)

## 2023-07-15 PROCEDURE — 99285 EMERGENCY DEPT VISIT HI MDM: CPT

## 2023-07-15 PROCEDURE — 93010 ELECTROCARDIOGRAM REPORT: CPT | Performed by: INTERNAL MEDICINE

## 2023-07-15 PROCEDURE — 85379 FIBRIN DEGRADATION QUANT: CPT

## 2023-07-15 PROCEDURE — 83690 ASSAY OF LIPASE: CPT

## 2023-07-15 PROCEDURE — 93005 ELECTROCARDIOGRAM TRACING: CPT | Performed by: EMERGENCY MEDICINE

## 2023-07-15 PROCEDURE — 80053 COMPREHEN METABOLIC PANEL: CPT

## 2023-07-15 PROCEDURE — 84484 ASSAY OF TROPONIN QUANT: CPT

## 2023-07-15 PROCEDURE — 85025 COMPLETE CBC W/AUTO DIFF WBC: CPT

## 2023-07-15 PROCEDURE — 71045 X-RAY EXAM CHEST 1 VIEW: CPT

## 2023-07-15 RX ORDER — OMEPRAZOLE 20 MG/1
20 CAPSULE, DELAYED RELEASE ORAL
Qty: 30 CAPSULE | Refills: 0 | Status: SHIPPED | OUTPATIENT
Start: 2023-07-15

## 2023-07-15 ASSESSMENT — HEART SCORE: ECG: 1

## 2023-07-15 ASSESSMENT — PAIN SCALES - GENERAL: PAINLEVEL_OUTOF10: 7

## 2023-07-15 ASSESSMENT — PAIN - FUNCTIONAL ASSESSMENT: PAIN_FUNCTIONAL_ASSESSMENT: 0-10

## 2023-07-15 NOTE — ED NOTES
Paperwork read with patient making note of where to  prescriptions. IV taken out. Armband removed and disposed of in shredder. Patient has no further questions at this time. Will discharge from system.        Venancio Garcia RN  07/15/23 7037

## 2023-07-15 NOTE — ED PROVIDER NOTES
EMERGENCY DEPARTMENT HISTORY AND PHYSICAL EXAM      Patient Name: Gerard Delgado  MRN: [id number]  YOB: 1950  Provider: Melonie Shell MD  PCP: Brent Butterfield MD   Time/Date of evaluation: 2:19 PM EDT on 7/15/23    History of Presenting Illness     Chief Complaint   Patient presents with    Chest Pain       History Provided By: Patient and Patient's Wife     History Dank Tilley):   Gerard Delgado is a 72 y.o. male with a PMHX of BPH, recent kidney stone  who presents to the emergency department  by POV C/O left upper quadrant, left-sided rib pain onset been present since mid May. Patient reports that he started feeling pain just under his left ribs in mid May. He reports his primary care doctor treated it for suspected diverticulitis with antibiotics without improvement. He reports shortly after this he was diagnosed with a kidney stone that was treated by Dr. Isabel Muhammad. He reports the pain has persisted and seemed worse today which triggered him coming to the emergency department. He reports it seems to be worse when he is laying flat. Denies fever, cough, shortness of breath, vomiting, other complaints. Does not smoke. Denies significant family history of heart disease. States he has a known left bundle branch block. Past History     Past Medical History:  Past Medical History:   Diagnosis Date    Asthma     adult onset. sees [facility]-allergy shot    BPH (benign prostatic hypertrophy) 2019    Cataract 2014    bilateral cataract removal    Exercise tolerance finding 06/14/2023    pt denies SOB/chest pain with going up/down 1-2 flights of stairs    History of COVID-19 11/05/2021    seen at ER with pneumonia, hypoxia    Kidney stones 06/11/2023    evaluated at [facility] ER.  Dr. Saroj Pal       Past Surgical History:  Past Surgical History:   Procedure Laterality Date    CATARACT REMOVAL Bilateral 2014    CYSTOSCOPY Left 6/15/2023    CYSTOSCOPY LEFT RETROGRADE PYELOGRAM LEFT

## 2023-08-14 ENCOUNTER — TRANSCRIBE ORDERS (OUTPATIENT)
Facility: HOSPITAL | Age: 73
End: 2023-08-14

## 2023-08-14 DIAGNOSIS — R10.13 EPIGASTRIC PAIN: Primary | ICD-10-CM

## 2023-08-24 ENCOUNTER — TRANSCRIBE ORDERS (OUTPATIENT)
Facility: HOSPITAL | Age: 73
End: 2023-08-24

## 2023-08-24 DIAGNOSIS — N20.0 KIDNEY STONE: Primary | ICD-10-CM

## 2023-08-28 ENCOUNTER — HOSPITAL ENCOUNTER (OUTPATIENT)
Facility: HOSPITAL | Age: 73
Discharge: HOME | End: 2023-08-31
Attending: UROLOGY
Payer: COMMERCIAL

## 2023-08-28 DIAGNOSIS — N20.0 KIDNEY STONE: ICD-10-CM

## 2023-08-28 PROCEDURE — 76770 US EXAM ABDO BACK WALL COMP: CPT

## 2023-09-05 ENCOUNTER — HOSPITAL ENCOUNTER (OUTPATIENT)
Facility: HOSPITAL | Age: 73
Discharge: HOME | End: 2023-09-08
Payer: COMMERCIAL

## 2023-09-05 DIAGNOSIS — R10.13 EPIGASTRIC PAIN: ICD-10-CM

## 2023-09-05 PROCEDURE — 2500000003 HC RX 250 WO HCPCS: Performed by: RADIOLOGY

## 2023-09-05 PROCEDURE — 6370000000 HC RX 637 (ALT 250 FOR IP): Performed by: RADIOLOGY

## 2023-09-05 PROCEDURE — 74240 X-RAY XM UPR GI TRC 1CNTRST: CPT

## 2023-09-05 RX ADMIN — ANTACID/ANTIFLATULENT 1 EACH: 380; 550; 10; 10 GRANULE, EFFERVESCENT ORAL at 11:43

## 2023-09-05 RX ADMIN — BARIUM SULFATE 340 G: 980 POWDER, FOR SUSPENSION ORAL at 11:43

## 2023-09-05 RX ADMIN — BARIUM SULFATE 176 G: 960 POWDER, FOR SUSPENSION ORAL at 11:43

## 2024-06-30 ENCOUNTER — HOSPITAL ENCOUNTER (EMERGENCY)
Facility: HOSPITAL | Age: 74
Discharge: HOME | End: 2024-06-30
Attending: STUDENT IN AN ORGANIZED HEALTH CARE EDUCATION/TRAINING PROGRAM
Payer: COMMERCIAL

## 2024-06-30 VITALS
HEART RATE: 58 BPM | TEMPERATURE: 97 F | SYSTOLIC BLOOD PRESSURE: 158 MMHG | OXYGEN SATURATION: 98 % | HEIGHT: 69 IN | RESPIRATION RATE: 16 BRPM | WEIGHT: 190 LBS | DIASTOLIC BLOOD PRESSURE: 65 MMHG | BODY MASS INDEX: 28.14 KG/M2

## 2024-06-30 DIAGNOSIS — S81.812A LACERATION OF LEFT LOWER EXTREMITY, INITIAL ENCOUNTER: Primary | ICD-10-CM

## 2024-06-30 PROCEDURE — 96372 THER/PROPH/DIAG INJ SC/IM: CPT

## 2024-06-30 PROCEDURE — 6370000000 HC RX 637 (ALT 250 FOR IP): Performed by: STUDENT IN AN ORGANIZED HEALTH CARE EDUCATION/TRAINING PROGRAM

## 2024-06-30 PROCEDURE — 99284 EMERGENCY DEPT VISIT MOD MDM: CPT

## 2024-06-30 PROCEDURE — 6360000002 HC RX W HCPCS: Performed by: STUDENT IN AN ORGANIZED HEALTH CARE EDUCATION/TRAINING PROGRAM

## 2024-06-30 PROCEDURE — 12004 RPR S/N/AX/GEN/TRK7.6-12.5CM: CPT

## 2024-06-30 RX ORDER — ACETAMINOPHEN 325 MG/1
650 TABLET ORAL
Status: COMPLETED | OUTPATIENT
Start: 2024-06-30 | End: 2024-06-30

## 2024-06-30 RX ORDER — KETOROLAC TROMETHAMINE 15 MG/ML
15 INJECTION, SOLUTION INTRAMUSCULAR; INTRAVENOUS
Status: COMPLETED | OUTPATIENT
Start: 2024-06-30 | End: 2024-06-30

## 2024-06-30 RX ORDER — HYDROCODONE BITARTRATE AND ACETAMINOPHEN 5; 325 MG/1; MG/1
1 TABLET ORAL
Status: COMPLETED | OUTPATIENT
Start: 2024-06-30 | End: 2024-06-30

## 2024-06-30 RX ADMIN — HYDROCODONE BITARTRATE AND ACETAMINOPHEN 1 TABLET: 5; 325 TABLET ORAL at 23:20

## 2024-06-30 RX ADMIN — ACETAMINOPHEN 650 MG: 325 TABLET ORAL at 22:36

## 2024-06-30 RX ADMIN — KETOROLAC TROMETHAMINE 15 MG: 15 INJECTION, SOLUTION INTRAMUSCULAR; INTRAVENOUS at 22:37

## 2024-06-30 ASSESSMENT — PAIN SCALES - GENERAL: PAINLEVEL_OUTOF10: 2

## 2024-06-30 ASSESSMENT — PAIN - FUNCTIONAL ASSESSMENT: PAIN_FUNCTIONAL_ASSESSMENT: 0-10

## 2024-07-01 NOTE — PROCEDURES
PROCEDURE NOTE  Date: 6/30/2024   Name: Eagle Lui  YOB: 1950    Lac Repair    Date/Time: 6/30/2024 11:16 PM    Performed by: Hugo Elizabeth MD  Authorized by: Hugo Elizabeth MD    Consent:     Consent obtained:  Verbal    Consent given by:  Patient    Risks discussed:  Infection and pain    Alternatives discussed:  No treatment  Universal protocol:     Patient identity confirmed:  Verbally with patient  Anesthesia:     Anesthesia method:  None  Laceration details:     Location:  Leg    Leg location:  L lower leg    Length (cm):  8  Treatment:     Area cleansed with:  Saline    Amount of cleaning:  Standard    Irrigation solution:  Sterile saline    Debridement:  None  Skin repair:     Repair method:  Steri-Strips and tissue adhesive    Number of Steri-Strips:  7  Approximation:     Approximation:  Close  Post-procedure details:     Dressing:  Non-adherent dressing    Procedure completion:  Tolerated well, no immediate complications

## 2024-07-01 NOTE — ED PROVIDER NOTES
EMERGENCY DEPARTMENT HISTORY AND PHYSICAL EXAM    11:18 PM      Date: 6/30/2024  Patient Name: Eagle Lui    History of Presenting Illness     Chief Complaint   Patient presents with    Laceration       History From: Patient    Eagle Lui is a 74 y.o. male   HPI  74-year-old male with history of BPH who presents with pain to the left ankle.  Patient said that he sustained a chainsaw accident and sustained a mild laceration to the left leg.  Denies any other trauma.  Patient on anticoagulation.  Pain is mild to moderate, intermittent, throbbing.  No alleviating factors.  When assessing ROS he denies any nausea, vomiting, chest pain, shortness of breath, numbness tingling of the foot, or any other changes.    Patient up-to-date on tetanus vaccination.  Last shot was 3 years ago.    Nursing Notes were all reviewed and agreed with or any disagreements were addressed in the HPI.    PCP: Delmer Lance MD    Current Facility-Administered Medications   Medication Dose Route Frequency Provider Last Rate Last Admin    HYDROcodone-acetaminophen (NORCO) 5-325 MG per tablet 1 tablet  1 tablet Oral NOW Hugo Elizabeth MD         Current Outpatient Medications   Medication Sig Dispense Refill    dutasteride-tamsulosin 0.5-0.4 MG CAPS Take by mouth every evening Indications: BPH         Past History     Past Medical History:  Past Medical History:   Diagnosis Date    Asthma     adult onset. sees [facility]-allergy shot    BPH (benign prostatic hypertrophy) 2019    Cataract 2014    bilateral cataract removal    Exercise tolerance finding 06/14/2023    pt denies SOB/chest pain with going up/down 1-2 flights of stairs    History of COVID-19 11/05/2021    seen at ER with pneumonia, hypoxia    Kidney stones 06/11/2023    evaluated at [facility] ER. Dr. Talley-urologist       Past Surgical History:  Past Surgical History:   Procedure Laterality Date    CATARACT REMOVAL Bilateral 2014    CYSTOSCOPY Left 6/15/2023

## 2024-07-01 NOTE — DISCHARGE INSTRUCTIONS
You were evaluated for laceration of the left leg .  Based on your work-up it was deemed that she was stable for discharge.  P Please follow-up with your primary care physician if you have any further concerns and go over your work-up.  If you experience any chest pain, shortness of breath, worsening abdominal pain, vomiting blood, worsening headache, seizures, or any worsening of your symptoms please return to the emergency department immediately.  If you have any pending results or any further questions please contact the emergency department at [phone]      Please make sure that leg is dry for the next 24 to 48 hours.

## 2025-03-03 ENCOUNTER — ANESTHESIA EVENT (OUTPATIENT)
Facility: HOSPITAL | Age: 75
End: 2025-03-03
Payer: COMMERCIAL

## 2025-03-05 NOTE — H&P
[facility]  [address]  [facility] [address]  Tel:  [phone]  Fax: [phone]    Patient :  Eagle Lui  YOB: 1950  Birth Sex:  Male  Date:   02/28/2025 10:40 AM   Visit Type:    Office Visit  Assessment/Plan  #  Detail Type  Description    Assessment  Urinary tract infection, site not specified (N39.0).    Patient Plan  Urinary tract infections--chronic problem with exacerbation due to passing a stone and having BPH.  Ciprofloxacin 500 mg p.o. b.i.d. preop prescribed  Cephalexin 500 mg p.o. every 8 hours postop prescribed    Plan Orders  The patient had the following order(s): UA In Office No Micro. Obtained on 02/28/2025, Interpretation: See module.         2.  Assessment  Enlarged prostate w/ LUTS (N40.1).    Patient Plan  Continue Flomax 0.8 mg, and dutasteride 0.5 mg daily.  He scheduled for Aquablation, cystolithalopaxy 3/06/25.    Continue as planned above.  Risk of surgery such as bleeding, infection, pain, organ damage, discomfort of a large catheter, blood postop, all have been reviewed and patient is aware.  Patient does not take NSAIDs normally but he was advised to not take anything but Tylenol for now until surgery.  Patient is not take anticoagulants therapy.  Patient is not prescribed GLP 1 medications.  Postop Percocet prescribed as patient reports that this is the best medication for postop pain.    Returned postop as scheduled    Plan Orders  Urine Culture, Routine to be performed. Obtained on 02/28/2025.         3.  Assessment  Feeling of incomplete bladder emptying (R39.14).    Patient Plan  Aqua ablation scheduled 03/16/2025  Voiding trial will be scheduled after    Future PVRs will be needed once the catheter has been removed.         4.  Assessment  Frequency of micturition (R35.0).    Patient Plan  Advised not to take Gemtesa for now         5.  Assessment  Dysuria (R30.0).    Patient Plan  Patient recently passed stone and his got a  updated)    Relationship  Family Member Name    Age at Death  Condition  Onset Age  Cause of Death          Family history of Diabetes mellitus    N          Family history of prostate cancer    N  Family h/o        Cancer - prostate        Social History  (Reviewed, updated)  Tobacco use reviewed.    Preferred language is English.      Marital Status/Family/Social Support  Marital status:     Tobacco use status: Never smoked tobacco.    Smoking status: Never smoked tobacco.    Tobacco Screening  Patient has never used tobacco. Patient has not used tobacco in the last 30 days. Patient has not used smokeless tobacco in the last 30 days.    Smoking Status  Type  Smoking Status  Usage Per Day  Years Used  Pack Years  Total Pack Years    Never smoked tobacco            Tobacco/Vaping Exposure  No passive vaping exposure.    Alcohol  There is no history of alcohol use.     Caffeine  The patient uses caffeine: coffee and chocolate. - 3 cups a day.    Implantable Device Details   Device Name  Device Placed On  Device Removed On  Notes  Endocardial/interventricular septal pacing lead  07/10/2024      Endocardial/interventricular septal pacing lead  07/10/2024      Dual-chamber implantable pacemaker, rate-responsive  07/10/2024      Extra-gynaecological surgical mesh, composite-polymer  10/13/2021      Polymeric ureteral stent  06/15/2023        Review of Systems  System  Neg/Pos  Details  Constitutional  Negative  Chills, Fever and Pain.  ENMT  Negative  Ear infections and Sore throat.  Eyes  Negative  Blurred vision, Double vision and Eye pain.  Respiratory  Negative  Asthma, Chronic cough, Dyspnea and Wheezing.  Cardio  Negative  Chest pain.  GI  Negative  Constipation, Decreased appetite, Diarrhea, Nausea and Vomiting.    Positive  Hematuria, Nocturia, Urinary frequency.    Negative  Slow stream and Urgency.  Endocrine  Negative  Cold intolerance, Heat intolerance, Increased thirst and Weight

## 2025-03-05 NOTE — H&P
[facility] [place]  [address]  [facility] [address]  Tel:  [phone]  Fax: [phone]    Patient :  BRITT JONES  YOB: 1996  Birth Sex:  Male  Date:   02/10/2025 3:40 PM   Visit Type:    Office Visit  Assessment/Plan  #  Detail Type  Description   1.  Assessment  Feeling of incomplete bladder emptying (R39.14).         2.  Assessment  Frequency of micturition (R35.0).    Patient Plan  PNE trial 12/20/2024  75% improvement  Patient would like to proceed with the [employer]on[employer] as soon as possible    Aware of risk of infection, implant my function, bleeding, migration of leads and apparatus, pain.  Patient will like to proceed    Does not take anticoagulants  Does not take GLP 1 medication    Tramadol 50 mg p.o. 1 tablet every 4-6 hours as needed for pain postop sent to pharmacy  Cephalexin 500 mg p.o. b.i.d. for 7 days postop sent to pharmacy    Plan Orders  Urine Culture, Routine to be performed. Obtained on 02/10/2025.         3.  Assessment  Urgency of urination (R39.15).    Plan Orders  The patient had the following order(s): UA In Office No Micro. Obtained on 02/10/2025, Interpretation: See module.         4.  Assessment  Nocturia (R35.1).              Additional Visit Information    This 29 year old patient presents for Overactive Bladder and Varicocele.    History of Present Illness  1.  Overactive Bladder   It occurs intermittently. The problem is worse. Associated symptoms include nocturia (2 times per night), slow stream, urgency and urinary frequency. Additional information: Patient with symptoms of urinary frequency slow stream at times he also has urge issues and waking up at night.  Sxs are intermittent..       Comments: 4/16/24  Pt here for f/u his sxs showed no change, sxs worse with coffee and when he takes Sudafed  Patient will keep a diary monitoring his caffeine and Sudafed intake I recommended he consider Flonase instead of Sudafed he will also try

## 2025-03-06 ENCOUNTER — HOSPITAL ENCOUNTER (OUTPATIENT)
Facility: HOSPITAL | Age: 75
Setting detail: OBSERVATION
Discharge: HOME | End: 2025-03-07
Attending: UROLOGY | Admitting: UROLOGY
Payer: COMMERCIAL

## 2025-03-06 ENCOUNTER — ANESTHESIA (OUTPATIENT)
Facility: HOSPITAL | Age: 75
End: 2025-03-06
Payer: COMMERCIAL

## 2025-03-06 PROBLEM — N40.1 BPH WITH URINARY OBSTRUCTION: Status: ACTIVE | Noted: 2025-03-06

## 2025-03-06 PROBLEM — N13.8 BPH WITH URINARY OBSTRUCTION: Status: ACTIVE | Noted: 2025-03-06

## 2025-03-06 PROCEDURE — 6370000000 HC RX 637 (ALT 250 FOR IP): Performed by: UROLOGY

## 2025-03-06 PROCEDURE — 3600000002 HC SURGERY LEVEL 2 BASE: Performed by: UROLOGY

## 2025-03-06 PROCEDURE — 6360000002 HC RX W HCPCS: Performed by: UROLOGY

## 2025-03-06 PROCEDURE — C2596 PROBE, ROBOTIC, WATER-JET: HCPCS | Performed by: UROLOGY

## 2025-03-06 PROCEDURE — 2580000003 HC RX 258: Performed by: UROLOGY

## 2025-03-06 PROCEDURE — G0378 HOSPITAL OBSERVATION PER HR: HCPCS

## 2025-03-06 PROCEDURE — 88305 TISSUE EXAM BY PATHOLOGIST: CPT

## 2025-03-06 PROCEDURE — 7100000000 HC PACU RECOVERY - FIRST 15 MIN: Performed by: UROLOGY

## 2025-03-06 PROCEDURE — 2500000003 HC RX 250 WO HCPCS

## 2025-03-06 PROCEDURE — 7100000001 HC PACU RECOVERY - ADDTL 15 MIN: Performed by: UROLOGY

## 2025-03-06 PROCEDURE — 6360000002 HC RX W HCPCS

## 2025-03-06 PROCEDURE — 3700000001 HC ADD 15 MINUTES (ANESTHESIA): Performed by: UROLOGY

## 2025-03-06 PROCEDURE — 3700000000 HC ANESTHESIA ATTENDED CARE: Performed by: UROLOGY

## 2025-03-06 PROCEDURE — 3600000012 HC SURGERY LEVEL 2 ADDTL 15MIN: Performed by: UROLOGY

## 2025-03-06 PROCEDURE — C1713 ANCHOR/SCREW BN/BN,TIS/BN: HCPCS | Performed by: UROLOGY

## 2025-03-06 PROCEDURE — 6360000002 HC RX W HCPCS: Performed by: ANESTHESIOLOGY

## 2025-03-06 PROCEDURE — 2709999900 HC NON-CHARGEABLE SUPPLY: Performed by: UROLOGY

## 2025-03-06 RX ORDER — ONDANSETRON 4 MG/1
4 TABLET, ORALLY DISINTEGRATING ORAL EVERY 8 HOURS PRN
Status: DISCONTINUED | OUTPATIENT
Start: 2025-03-06 | End: 2025-03-07 | Stop reason: HOSPADM

## 2025-03-06 RX ORDER — HYDROCODONE BITARTRATE AND ACETAMINOPHEN 5; 325 MG/1; MG/1
1 TABLET ORAL EVERY 4 HOURS PRN
Status: DISCONTINUED | OUTPATIENT
Start: 2025-03-06 | End: 2025-03-07 | Stop reason: HOSPADM

## 2025-03-06 RX ORDER — HYDROMORPHONE HYDROCHLORIDE 1 MG/ML
0.5 INJECTION, SOLUTION INTRAMUSCULAR; INTRAVENOUS; SUBCUTANEOUS EVERY 5 MIN PRN
Status: COMPLETED | OUTPATIENT
Start: 2025-03-06 | End: 2025-03-06

## 2025-03-06 RX ORDER — ONDANSETRON 2 MG/ML
4 INJECTION INTRAMUSCULAR; INTRAVENOUS
Status: DISCONTINUED | OUTPATIENT
Start: 2025-03-06 | End: 2025-03-06 | Stop reason: HOSPADM

## 2025-03-06 RX ORDER — SODIUM CHLORIDE 9 MG/ML
INJECTION, SOLUTION INTRAVENOUS PRN
Status: DISCONTINUED | OUTPATIENT
Start: 2025-03-06 | End: 2025-03-07 | Stop reason: HOSPADM

## 2025-03-06 RX ORDER — HYOSCYAMINE SULFATE 0.5 MG/ML
INJECTION, SOLUTION SUBCUTANEOUS
Status: DISCONTINUED | OUTPATIENT
Start: 2025-03-06 | End: 2025-03-06 | Stop reason: SDUPTHER

## 2025-03-06 RX ORDER — SODIUM CHLORIDE 9 MG/ML
INJECTION, SOLUTION INTRAVENOUS CONTINUOUS
Status: DISCONTINUED | OUTPATIENT
Start: 2025-03-06 | End: 2025-03-07 | Stop reason: HOSPADM

## 2025-03-06 RX ORDER — NALOXONE HYDROCHLORIDE 0.4 MG/ML
INJECTION, SOLUTION INTRAMUSCULAR; INTRAVENOUS; SUBCUTANEOUS PRN
Status: DISCONTINUED | OUTPATIENT
Start: 2025-03-06 | End: 2025-03-06 | Stop reason: HOSPADM

## 2025-03-06 RX ORDER — SODIUM CHLORIDE 0.9 % (FLUSH) 0.9 %
5-40 SYRINGE (ML) INJECTION EVERY 12 HOURS SCHEDULED
Status: DISCONTINUED | OUTPATIENT
Start: 2025-03-06 | End: 2025-03-07 | Stop reason: HOSPADM

## 2025-03-06 RX ORDER — FENTANYL CITRATE 50 UG/ML
INJECTION, SOLUTION INTRAMUSCULAR; INTRAVENOUS
Status: DISCONTINUED | OUTPATIENT
Start: 2025-03-06 | End: 2025-03-06 | Stop reason: SDUPTHER

## 2025-03-06 RX ORDER — HYOSCYAMINE SULFATE 0.5 MG/ML
0.5 INJECTION, SOLUTION SUBCUTANEOUS 3 TIMES DAILY PRN
Status: DISCONTINUED | OUTPATIENT
Start: 2025-03-06 | End: 2025-03-07 | Stop reason: HOSPADM

## 2025-03-06 RX ORDER — OXYCODONE HYDROCHLORIDE 5 MG/1
5 TABLET ORAL PRN
Status: DISCONTINUED | OUTPATIENT
Start: 2025-03-06 | End: 2025-03-06 | Stop reason: HOSPADM

## 2025-03-06 RX ORDER — ROCURONIUM BROMIDE 10 MG/ML
INJECTION, SOLUTION INTRAVENOUS
Status: DISCONTINUED | OUTPATIENT
Start: 2025-03-06 | End: 2025-03-06 | Stop reason: SDUPTHER

## 2025-03-06 RX ORDER — LIDOCAINE HYDROCHLORIDE 20 MG/ML
INJECTION, SOLUTION EPIDURAL; INFILTRATION; INTRACAUDAL; PERINEURAL
Status: DISCONTINUED | OUTPATIENT
Start: 2025-03-06 | End: 2025-03-06 | Stop reason: SDUPTHER

## 2025-03-06 RX ORDER — ONDANSETRON 2 MG/ML
4 INJECTION INTRAMUSCULAR; INTRAVENOUS EVERY 6 HOURS PRN
Status: DISCONTINUED | OUTPATIENT
Start: 2025-03-06 | End: 2025-03-07 | Stop reason: HOSPADM

## 2025-03-06 RX ORDER — LEVOFLOXACIN 5 MG/ML
500 INJECTION, SOLUTION INTRAVENOUS
Status: COMPLETED | OUTPATIENT
Start: 2025-03-06 | End: 2025-03-06

## 2025-03-06 RX ORDER — OXYCODONE HYDROCHLORIDE 5 MG/1
10 TABLET ORAL PRN
Status: DISCONTINUED | OUTPATIENT
Start: 2025-03-06 | End: 2025-03-06 | Stop reason: HOSPADM

## 2025-03-06 RX ORDER — PROPOFOL 10 MG/ML
INJECTION, EMULSION INTRAVENOUS
Status: DISCONTINUED | OUTPATIENT
Start: 2025-03-06 | End: 2025-03-06 | Stop reason: SDUPTHER

## 2025-03-06 RX ORDER — SODIUM CHLORIDE, SODIUM LACTATE, POTASSIUM CHLORIDE, CALCIUM CHLORIDE 600; 310; 30; 20 MG/100ML; MG/100ML; MG/100ML; MG/100ML
INJECTION, SOLUTION INTRAVENOUS CONTINUOUS
Status: DISCONTINUED | OUTPATIENT
Start: 2025-03-06 | End: 2025-03-07 | Stop reason: HOSPADM

## 2025-03-06 RX ORDER — ONDANSETRON 2 MG/ML
INJECTION INTRAMUSCULAR; INTRAVENOUS
Status: DISCONTINUED | OUTPATIENT
Start: 2025-03-06 | End: 2025-03-06 | Stop reason: SDUPTHER

## 2025-03-06 RX ORDER — DEXAMETHASONE SODIUM PHOSPHATE 4 MG/ML
INJECTION, SOLUTION INTRA-ARTICULAR; INTRALESIONAL; INTRAMUSCULAR; INTRAVENOUS; SOFT TISSUE
Status: DISCONTINUED | OUTPATIENT
Start: 2025-03-06 | End: 2025-03-06 | Stop reason: SDUPTHER

## 2025-03-06 RX ORDER — MIDAZOLAM HYDROCHLORIDE 1 MG/ML
INJECTION, SOLUTION INTRAMUSCULAR; INTRAVENOUS
Status: DISCONTINUED | OUTPATIENT
Start: 2025-03-06 | End: 2025-03-06 | Stop reason: SDUPTHER

## 2025-03-06 RX ORDER — FENTANYL CITRATE 50 UG/ML
50 INJECTION, SOLUTION INTRAMUSCULAR; INTRAVENOUS EVERY 5 MIN PRN
Status: COMPLETED | OUTPATIENT
Start: 2025-03-06 | End: 2025-03-06

## 2025-03-06 RX ORDER — SODIUM CHLORIDE 0.9 % (FLUSH) 0.9 %
5-40 SYRINGE (ML) INJECTION PRN
Status: DISCONTINUED | OUTPATIENT
Start: 2025-03-06 | End: 2025-03-07 | Stop reason: HOSPADM

## 2025-03-06 RX ORDER — GLYCOPYRROLATE 0.2 MG/ML
INJECTION INTRAMUSCULAR; INTRAVENOUS
Status: DISCONTINUED | OUTPATIENT
Start: 2025-03-06 | End: 2025-03-06 | Stop reason: SDUPTHER

## 2025-03-06 RX ADMIN — FENTANYL CITRATE 50 MCG: 50 INJECTION INTRAMUSCULAR; INTRAVENOUS at 11:09

## 2025-03-06 RX ADMIN — HYDROMORPHONE HYDROCHLORIDE 0.5 MG: 1 INJECTION, SOLUTION INTRAMUSCULAR; INTRAVENOUS; SUBCUTANEOUS at 10:34

## 2025-03-06 RX ADMIN — HYDROMORPHONE HYDROCHLORIDE 0.5 MG: 1 INJECTION, SOLUTION INTRAMUSCULAR; INTRAVENOUS; SUBCUTANEOUS at 10:25

## 2025-03-06 RX ADMIN — FENTANYL CITRATE 25 MCG: 50 INJECTION, SOLUTION INTRAMUSCULAR; INTRAVENOUS at 08:43

## 2025-03-06 RX ADMIN — FENTANYL CITRATE 50 MCG: 50 INJECTION INTRAMUSCULAR; INTRAVENOUS at 10:47

## 2025-03-06 RX ADMIN — PROPOFOL 200 MG: 10 INJECTION, EMULSION INTRAVENOUS at 08:38

## 2025-03-06 RX ADMIN — DEXAMETHASONE SODIUM PHOSPHATE 4 MG: 4 INJECTION INTRA-ARTICULAR; INTRALESIONAL; INTRAMUSCULAR; INTRAVENOUS; SOFT TISSUE at 08:42

## 2025-03-06 RX ADMIN — LEVOFLOXACIN 500 MG: 5 INJECTION, SOLUTION INTRAVENOUS at 08:42

## 2025-03-06 RX ADMIN — HYOSCYAMINE SULFATE 0.5 MG: 0.5 INJECTION, SOLUTION SUBCUTANEOUS at 23:19

## 2025-03-06 RX ADMIN — HYDROCODONE BITARTRATE AND ACETAMINOPHEN 1 TABLET: 5; 325 TABLET ORAL at 20:12

## 2025-03-06 RX ADMIN — SODIUM CHLORIDE: 900 INJECTION, SOLUTION INTRAVENOUS at 06:48

## 2025-03-06 RX ADMIN — FENTANYL CITRATE 25 MCG: 50 INJECTION, SOLUTION INTRAMUSCULAR; INTRAVENOUS at 08:50

## 2025-03-06 RX ADMIN — ONDANSETRON 4 MG: 2 INJECTION, SOLUTION INTRAMUSCULAR; INTRAVENOUS at 09:34

## 2025-03-06 RX ADMIN — HYDROCODONE BITARTRATE AND ACETAMINOPHEN 1 TABLET: 5; 325 TABLET ORAL at 14:44

## 2025-03-06 RX ADMIN — HYOSCYAMINE SULFATE 0.5 MG: 0.5 INJECTION, SOLUTION SUBCUTANEOUS at 11:19

## 2025-03-06 RX ADMIN — FENTANYL CITRATE 25 MCG: 50 INJECTION, SOLUTION INTRAMUSCULAR; INTRAVENOUS at 09:29

## 2025-03-06 RX ADMIN — MIDAZOLAM 2 MG: 1 INJECTION INTRAMUSCULAR; INTRAVENOUS at 08:36

## 2025-03-06 RX ADMIN — HYOSCYAMINE SULFATE 0.5 MG: 0.5 INJECTION, SOLUTION SUBCUTANEOUS at 09:34

## 2025-03-06 RX ADMIN — ROCURONIUM BROMIDE 25 MG: 10 INJECTION, SOLUTION INTRAVENOUS at 09:12

## 2025-03-06 RX ADMIN — ROCURONIUM BROMIDE 25 MG: 10 INJECTION, SOLUTION INTRAVENOUS at 09:27

## 2025-03-06 RX ADMIN — HYOSCYAMINE SULFATE 0.5 MG: 0.5 INJECTION, SOLUTION SUBCUTANEOUS at 17:27

## 2025-03-06 RX ADMIN — SUGAMMADEX 200 MG: 100 INJECTION, SOLUTION INTRAVENOUS at 09:42

## 2025-03-06 RX ADMIN — GLYCOPYRROLATE 0.2 MG: 0.2 INJECTION INTRAMUSCULAR; INTRAVENOUS at 08:38

## 2025-03-06 RX ADMIN — LIDOCAINE HYDROCHLORIDE 100 MG: 20 INJECTION, SOLUTION EPIDURAL; INFILTRATION; INTRACAUDAL; PERINEURAL at 08:38

## 2025-03-06 RX ADMIN — FENTANYL CITRATE 25 MCG: 50 INJECTION, SOLUTION INTRAMUSCULAR; INTRAVENOUS at 09:16

## 2025-03-06 RX ADMIN — SODIUM CHLORIDE: 900 INJECTION, SOLUTION INTRAVENOUS at 19:57

## 2025-03-06 ASSESSMENT — LIFESTYLE VARIABLES: SMOKING_STATUS: 0

## 2025-03-06 ASSESSMENT — PAIN DESCRIPTION - LOCATION
LOCATION: GROIN
LOCATION: GROIN;PENIS
LOCATION: GROIN;PENIS
LOCATION: PENIS
LOCATION: PENIS
LOCATION: ABDOMEN
LOCATION: ABDOMEN
LOCATION: PENIS
LOCATION: GROIN
LOCATION: PENIS
LOCATION: GROIN;PENIS
LOCATION: PENIS

## 2025-03-06 ASSESSMENT — PAIN DESCRIPTION - DESCRIPTORS
DESCRIPTORS: PRESSURE
DESCRIPTORS: PRESSURE
DESCRIPTORS: CRAMPING
DESCRIPTORS: ACHING
DESCRIPTORS: BURNING
DESCRIPTORS: ACHING
DESCRIPTORS: BURNING
DESCRIPTORS: ACHING
DESCRIPTORS: CRAMPING

## 2025-03-06 ASSESSMENT — PAIN DESCRIPTION - PAIN TYPE
TYPE: SURGICAL PAIN

## 2025-03-06 ASSESSMENT — PAIN DESCRIPTION - ONSET
ONSET: ON-GOING
ONSET: AWAKENED FROM SLEEP

## 2025-03-06 ASSESSMENT — PAIN DESCRIPTION - FREQUENCY
FREQUENCY: CONTINUOUS

## 2025-03-06 ASSESSMENT — PAIN SCALES - GENERAL
PAINLEVEL_OUTOF10: 8
PAINLEVEL_OUTOF10: 6
PAINLEVEL_OUTOF10: 6
PAINLEVEL_OUTOF10: 5
PAINLEVEL_OUTOF10: 7
PAINLEVEL_OUTOF10: 5
PAINLEVEL_OUTOF10: 6
PAINLEVEL_OUTOF10: 8
PAINLEVEL_OUTOF10: 4
PAINLEVEL_OUTOF10: 6
PAINLEVEL_OUTOF10: 8
PAINLEVEL_OUTOF10: 6

## 2025-03-06 ASSESSMENT — PAIN DESCRIPTION - ORIENTATION
ORIENTATION: LOWER
ORIENTATION: ANTERIOR
ORIENTATION: DISTAL
ORIENTATION: DISTAL
ORIENTATION: ANTERIOR
ORIENTATION: DISTAL
ORIENTATION: DISTAL
ORIENTATION: LOWER
ORIENTATION: DISTAL
ORIENTATION: ANTERIOR

## 2025-03-06 ASSESSMENT — PAIN - FUNCTIONAL ASSESSMENT
PAIN_FUNCTIONAL_ASSESSMENT: ACTIVITIES ARE NOT PREVENTED
PAIN_FUNCTIONAL_ASSESSMENT: 0-10
PAIN_FUNCTIONAL_ASSESSMENT: ACTIVITIES ARE NOT PREVENTED

## 2025-03-06 NOTE — PERIOP NOTE
TRANSFER - IN REPORT:    Verbal report received from ORN & CRNA on Eagle Lui  being received from OR for routine progression of patient care      Report consisted of patient's Situation, Background, Assessment and   Recommendations(SBAR).     Information from the following report(s) Adult Overview, Surgery Report, Intake/Output, and MAR was reviewed with the receiving nurse.    Opportunity for questions and clarification was provided.      Assessment completed upon patient's arrival to unit and care assumed.

## 2025-03-06 NOTE — OP NOTE
Operative Note      Patient: Eagle Lui  YOB: 1950  MRN: [id number]    Date of Procedure: 3/6/2025    Pre-Op Diagnosis Codes:      * Benign prostatic hyperplasia with lower urinary tract symptoms, symptom details unspecified [N40.1], bladder stones    Post-Op Diagnosis: Same       Procedure(s):  AQUABLATION, CYSTOSCOPY, POSSIBLE CYSTOLITHOLAPAXY WITH THULIUM LASER \"SPEC POP\"    Surgeon(s):  Jace Talley MD    Assistant:   * No surgical staff found *    Anesthesia: Other    Estimated Blood Loss (mL): Minimal    Complications: None    Specimens:   ID Type Source Tests Collected by Time Destination   A : Prostate Chips Tissue Prostate SURGICAL PATHOLOGY Jace Talley MD 3/6/2025 0759        Implants:  * No implants in log *      Drains:   Urinary Catheter 03/06/25 Coude;3 Way (Active)   $ Urethral catheter insertion Inserted for procedure 03/06/25 1015   Catheter Indications Perioperative use for selected surgical procedures 03/06/25 1015   Site Assessment No urethral drainage 03/06/25 1015   Urine Color Cherry 03/06/25 1015   Urine Appearance Clear 03/06/25 1015   Collection Container Standard 03/06/25 1015   Securement Method Securing device (Describe) 03/06/25 1015   Catheter Best Practices  Drainage tube clipped to bed;Catheter secured to thigh;Tamper seal intact;Bag below bladder;Bag not on floor;Lack of dependent loop in tubing;Drainage bag less than half full 03/06/25 1015   Status Continuous bladder irrigation 03/06/25 1015   Rate [place] 03/06/25 1015   Irrigant Normal saline 03/06/25 1015   CBI Kim Output (mL) 1100 mL 03/06/25 1137       Findings:  Infection Present At Time Of Surgery (PATOS) (choose all levels that have infection present):  No infection present  Other Findings: 2 bladder stones at least 2-1/2 cm each    Detailed Description of Procedure:   Patient brought the op room and placed in supine position after ministration of general anesthesia he was placed into lithotomy

## 2025-03-06 NOTE — ANESTHESIA PRE PROCEDURE
complications:   Airway: Mallampati: III  TM distance: >3 FB   Neck ROM: full  Mouth opening: > = 3 FB   Dental: normal exam   (+) caps      Pulmonary:normal exam    (+)           asthma: exercise-induced asthma,     (-) COPD, recent URI, sleep apnea and not a current smoker                           Cardiovascular:    (+) pacemaker: pacemaker, dysrhythmias:    (-) hypertension, past MI, CAD, CABG/stent,  angina and  CHF             ROS comment: Complete heart block     Neuro/Psych:   Negative Neuro/Psych ROS              GI/Hepatic/Renal:   (+) renal disease: kidney stones     (-) GERD, hepatitis, liver disease and no morbid obesity       Endo/Other: Negative Endo/Other ROS                    Abdominal:             Vascular:          Other Findings:             Anesthesia Plan      general     ASA 3       Induction: intravenous.      Anesthetic plan and risks discussed with patient and spouse.      Plan discussed with CRNA.                    Desmond Fay DO   3/6/2025

## 2025-03-06 NOTE — PERIOP NOTE
Reviewed PTA medication list with patient/caregiver and patient/caregiver denies any additional medications.     Patient admits to having a responsible adult care for them at home for at least 24 hours after surgery.    Patient encouraged to use gown warming system and informed that using said warming gown to regulate body temperature prior to a procedure has been shown to help reduce the risks of blood clots and infection.    Patient's pharmacy of choice verified and documented in PTA medication section.    Dual skin assessment & fall risk band verification completed with JOHN POOL RN.

## 2025-03-06 NOTE — PERIOP NOTE
TRANSFER - OUT REPORT:    Verbal report given to MONICA Bonner on Eagle Lui  being transferred to [address] [place] [address] for routine progression of patient care       Report consisted of patient's Situation, Background, Assessment and   Recommendations(SBAR).     Information from the following report(s) Adult Overview, Surgery Report, Intake/Output, and MAR was reviewed with the receiving nurse.           Lines:   Peripheral IV 03/06/25 Left Forearm (Active)   Site Assessment Clean, dry & intact 03/06/25 1138   Line Status Infusing 03/06/25 1138   Line Care Connections checked and tightened 03/06/25 0648   Phlebitis Assessment No symptoms 03/06/25 1138   Infiltration Assessment 0 03/06/25 1138   Dressing Status Clean, dry & intact 03/06/25 1138   Dressing Type Transparent 03/06/25 1138        Opportunity for questions and clarification was provided.      Patient transported with:  Registered Nurse

## 2025-03-06 NOTE — ANESTHESIA POSTPROCEDURE EVALUATION
Department of Anesthesiology  Postprocedure Note    Patient: Eagle Lui  MRN: [id number]  YOB: 1950  Date of evaluation: 3/6/2025    Procedure Summary       Date: 03/06/25 Room / Location: [facility] MAIN CYSTO / [facility] MAIN OR    Anesthesia Start: 0836 Anesthesia Stop: 1007    Procedure: AQUABLATION, CYSTOSCOPY, POSSIBLE CYSTOLITHOLAPAXY WITH THULIUM LASER \"SPEC POP\" (Bladder) Diagnosis:       Benign prostatic hyperplasia with lower urinary tract symptoms, symptom details unspecified      (Benign prostatic hyperplasia with lower urinary tract symptoms, symptom details unspecified [N40.1])    Surgeons: Jace Talley MD Responsible Provider: Uziel Crook MD    Anesthesia Type: General ASA Status: 3            Anesthesia Type: General    Eli Phase I: Eli Score: 7    Eli Phase II:      Anesthesia Post Evaluation    Patient participation: complete - patient participated  Level of consciousness: awake  Airway patency: patent  Nausea & Vomiting: no nausea and no vomiting  Cardiovascular status: hemodynamically stable  Respiratory status: acceptable  Hydration status: stable  Pain management: satisfactory to patient    No notable events documented.

## 2025-03-07 VITALS
WEIGHT: 205 LBS | DIASTOLIC BLOOD PRESSURE: 58 MMHG | TEMPERATURE: 98.2 F | HEIGHT: 70 IN | RESPIRATION RATE: 18 BRPM | OXYGEN SATURATION: 97 % | SYSTOLIC BLOOD PRESSURE: 114 MMHG | BODY MASS INDEX: 29.35 KG/M2 | HEART RATE: 62 BPM

## 2025-03-07 PROCEDURE — 96374 THER/PROPH/DIAG INJ IV PUSH: CPT

## 2025-03-07 PROCEDURE — 2580000003 HC RX 258: Performed by: UROLOGY

## 2025-03-07 PROCEDURE — G0378 HOSPITAL OBSERVATION PER HR: HCPCS

## 2025-03-07 PROCEDURE — 6370000000 HC RX 637 (ALT 250 FOR IP): Performed by: UROLOGY

## 2025-03-07 PROCEDURE — 6360000002 HC RX W HCPCS: Performed by: UROLOGY

## 2025-03-07 RX ADMIN — SODIUM CHLORIDE: 900 INJECTION, SOLUTION INTRAVENOUS at 04:06

## 2025-03-07 RX ADMIN — HYOSCYAMINE SULFATE 0.5 MG: 0.5 INJECTION, SOLUTION SUBCUTANEOUS at 07:45

## 2025-03-07 RX ADMIN — HYDROCODONE BITARTRATE AND ACETAMINOPHEN 1 TABLET: 5; 325 TABLET ORAL at 00:39

## 2025-03-07 ASSESSMENT — PAIN DESCRIPTION - DESCRIPTORS
DESCRIPTORS: ACHING

## 2025-03-07 ASSESSMENT — PAIN DESCRIPTION - FREQUENCY
FREQUENCY: CONTINUOUS

## 2025-03-07 ASSESSMENT — PAIN DESCRIPTION - ONSET
ONSET: ON-GOING

## 2025-03-07 ASSESSMENT — PAIN DESCRIPTION - LOCATION
LOCATION: GROIN;PENIS
LOCATION: GROIN
LOCATION: GROIN;PENIS

## 2025-03-07 ASSESSMENT — PAIN SCALES - GENERAL
PAINLEVEL_OUTOF10: 4
PAINLEVEL_OUTOF10: 7
PAINLEVEL_OUTOF10: 4

## 2025-03-07 ASSESSMENT — PAIN DESCRIPTION - ORIENTATION
ORIENTATION: ANTERIOR

## 2025-03-07 ASSESSMENT — PAIN - FUNCTIONAL ASSESSMENT
PAIN_FUNCTIONAL_ASSESSMENT: ACTIVITIES ARE NOT PREVENTED

## 2025-03-07 ASSESSMENT — PAIN DESCRIPTION - PAIN TYPE
TYPE: SURGICAL PAIN

## 2025-03-07 NOTE — PROGRESS NOTES
Pt discharged per MD order. RN at the bedside with printed discharge paperwork to include post op instructions, ro care and medications. All questions and concerns addressed at this time. Pt and pt's wife verbalize understanding and agree to discharge this day. RN provided additional ro care supplies to pt and placed pt on leg bag as ordered by MD. RN removed PIV prior to discharge and pt safely transported downstairs via wheelchair with all belongings.

## 2025-03-07 NOTE — DISCHARGE SUMMARY
Discharge Summary     Patient ID:  Eagle Lui  [id number]   74 y.o.  1950    Admit date: 3/6/2025    Discharge Date: 3/7/2025      Admitting Physician: Jace Talley MD     Discharge Physician: Jace Talley MD    Admission Diagnoses: Benign prostatic hyperplasia with lower urinary tract symptoms, symptom details unspecified [N40.1]  BPH with urinary obstruction [N40.1, N13.8]    Last Procedure: Procedure(s):  AQUABLATION, CYSTOSCOPY, POSSIBLE CYSTOLITHOLAPAXY WITH THULIUM LASER \"SPEC POP\"    Discharge Diagnoses: Principal Problem:    BPH with urinary obstruction  Resolved Problems:    * No resolved hospital problems. *       Discharge Condition: Stable    Consults: None    Significant Diagnostic Studies: None    Hospital Course:   Normal hospital course for this procedure.    Disposition: Home    Patient Instructions:   Current Discharge Medication List        CONTINUE these medications which have NOT CHANGED    Details   Naproxen Sodium (ALEVE) 220 MG CAPS Take by mouth      Multiple Vitamins-Minerals (THERAPEUTIC MULTIVITAMIN-MINERALS) tablet Take 1 tablet by mouth daily      dutasteride-tamsulosin 0.5-0.4 MG CAPS Take by mouth every evening Indications: BPH             Diet: As per preop    Activity: Reference my discharge instructions.        Signed:  Jace Talley MD  March 7, 2025  6:34 AM

## 2025-03-07 NOTE — PLAN OF CARE
Problem: Safety - Adult  Goal: Free from fall injury  3/7/2025 1002 by Marie Durand RN  Outcome: Adequate for Discharge  3/6/2025 2355 by Vanessa Tan RN  Outcome: Progressing  Flowsheets (Taken 3/6/2025 2243)  Free From Fall Injury: Instruct family/caregiver on patient safety     Problem: Pain  Goal: Verbalizes/displays adequate comfort level or baseline comfort level  3/7/2025 1002 by Marie Durand RN  Outcome: Adequate for Discharge  3/6/2025 2355 by Vanessa Tan RN  Outcome: Progressing  Flowsheets (Taken 3/6/2025 2243)  Verbalizes/displays adequate comfort level or baseline comfort level:   Encourage patient to monitor pain and request assistance   Assess pain using appropriate pain scale   Administer analgesics based on type and severity of pain and evaluate response   Implement non-pharmacological measures as appropriate and evaluate response   Notify Licensed Independent Practitioner if interventions unsuccessful or patient reports new pain     Problem: Discharge Planning  Goal: Discharge to home or other facility with appropriate resources  3/7/2025 1002 by Marie Durand RN  Outcome: Adequate for Discharge  3/6/2025 2355 by Vanessa Tan RN  Outcome: Progressing  Flowsheets  Taken 3/6/2025 2243 by Vanessa Tan RN  Discharge to home or other facility with appropriate resources: Identify barriers to discharge with patient and caregiver  Taken 3/6/2025 1200 by Marie Durand RN  Discharge to home or other facility with appropriate resources: Identify barriers to discharge with patient and caregiver     Problem: Skin/Tissue Integrity  Goal: Skin integrity remains intact  Description: 1.  Monitor for areas of redness and/or skin breakdown  2.  Assess vascular access sites hourly  3.  Every 4-6 hours minimum:  Change oxygen saturation probe site  4.  Every 4-6 hours:  If on nasal continuous positive airway pressure, respiratory therapy assess nares and determine need for

## 2025-03-07 NOTE — PROGRESS NOTES
20:12 Assessment completed. Lungs are clear bilat. CBI continues with cherry red  urine.Resting quietly in bed working on his tablet. Wife remains @ bedside in the recliner.    22:45 Shift assessment completed. See nsg flow shet for details.     03:00 Reassessed with 0 changes noted. CBI continues with cherry red - pale pink  urine. Resting quietly in bed with wife @ bedside in the recliner.    07:10 Bedside and Verbal shift change report given to BRAN Durand RN (oncoming nurse) by OTONIEL Tan RN (offgoing nurse). Report included the following information Nurse Handoff Report.

## 2025-03-07 NOTE — PLAN OF CARE
Problem: Safety - Adult  Goal: Free from fall injury  Outcome: Progressing  Flowsheets (Taken 3/6/2025 2243)  Free From Fall Injury: Instruct family/caregiver on patient safety     Problem: Pain  Goal: Verbalizes/displays adequate comfort level or baseline comfort level  Outcome: Progressing  Flowsheets (Taken 3/6/2025 2243)  Verbalizes/displays adequate comfort level or baseline comfort level:   Encourage patient to monitor pain and request assistance   Assess pain using appropriate pain scale   Administer analgesics based on type and severity of pain and evaluate response   Implement non-pharmacological measures as appropriate and evaluate response   Notify Licensed Independent Practitioner if interventions unsuccessful or patient reports new pain     Problem: Discharge Planning  Goal: Discharge to home or other facility with appropriate resources  Outcome: Progressing  Flowsheets  Taken 3/6/2025 2243 by Vanessa Tan RN  Discharge to home or other facility with appropriate resources: Identify barriers to discharge with patient and caregiver  Taken 3/6/2025 1200 by Marie Durand RN  Discharge to home or other facility with appropriate resources: Identify barriers to discharge with patient and caregiver     Problem: Skin/Tissue Integrity  Goal: Skin integrity remains intact  Description: 1.  Monitor for areas of redness and/or skin breakdown  2.  Assess vascular access sites hourly  3.  Every 4-6 hours minimum:  Change oxygen saturation probe site  4.  Every 4-6 hours:  If on nasal continuous positive airway pressure, respiratory therapy assess nares and determine need for appliance change or resting period  Outcome: Progressing  Flowsheets  Taken 3/6/2025 2243 by Vanessa Tan RN  Skin Integrity Remains Intact: Monitor for areas of redness and/or skin breakdown  Taken 3/6/2025 1200 by Marie Durand RN  Skin Integrity Remains Intact: Monitor for areas of redness and/or skin breakdown

## 2025-03-07 NOTE — PROGRESS NOTES
Urology Progress Note    Patient: Eagle Lui MRN: [id number] SSN: [id number]    YOB: 1950  Age: 74 y.o. Sex: male    DOA: 3/6/2025 LOS:  LOS: 0 days              Subjective:   Patient doing well this morning had a good night.    Objective:    Visit Vitals  /64   Pulse 62   Temp 98.2 °F (36.8 °C) (Oral)   Resp 16   Ht 1.778 m (5' 10\")   Wt 93 kg (205 lb)   SpO2 93%   BMI 29.41 kg/m²     Temp (24hrs), Av.8 °F (36.6 °C), Min:97.5 °F (36.4 °C), Max:98.2 °F (36.8 °C)      Intake and Output:  701 - 1900  In: 1000 [I.V.:1000]  Out: 850 [Urine:850]  1901 -  0700  In: 420 [P.O.:420]  Out:      Physical Exam:    Kim: Urine clear to light pink  Lab/Data Reviewed:  No results for input(s): \"NA\", \"K\", \"CL\", \"CO2\", \"BUN\", \"CREATININE\", \"GLUCOSE\", \"CALCIUM\" in the last 72 hours.   No results for input(s): \"WBC\", \"RBC\", \"HGB\", \"HCT\", \"MCV\", \"MCH\", \"MCHC\", \"RDW\", \"PLT\", \"MPV\" in the last 72 hours.    Medications Reviewed.    Assessment/Plan:   Principal Problem:    BPH with urinary obstruction  Resolved Problems:    * No resolved hospital problems. *      Status Post:  Procedure(s):  AQUABLATION, CYSTOSCOPY, POSSIBLE CYSTOLITHOLAPAXY WITH THULIUM LASER \"SPEC POP\"   Impression: Postop day #1 doing very well    Plan:  DC home  DC CBI  Voiding trial my office Monday morning 8 AM    Jace Talley MD  2025

## 2025-03-07 NOTE — DISCHARGE INSTRUCTIONS
Jace Talley M.D.  [facility]  [address], [address], [place] [address]  Office: [phone]  Fax:    [phone]    PROCEDURE: Procedure(s):  AQUABLATION, CYSTOSCOPY, POSSIBLE CYSTOLITHOLAPAXY WITH THULIUM LASER \"SPEC POP\"    Notify [facility] IMMEDIATELY if any of the following occur:    You are unable to urinate.  Urgency to urinate is not uncommon.  You find yourself urinating small frequent amounts associated with severe lower abdominal discomfort.  Bright red blood with clots in the urine. Some reddish urine is not uncommon and should be treated with increasing the amount of fluids you drink.  Temperature above 101.5° and / or chills.  You are nauseous and / or vomiting and you cannot hold down any fluids.  Your pain is not controlled with the pain medication prescribed.    Special Considerations:     Do not drive for at least 24 hours after the procedure and until you are no longer taking narcotic pain medication and you are able to move and react without hesitation.      MEDICATIONS:  Pain   []  Norco®   []  Percocet®  [] Dilaudid®    []  Tramadol  [] Ketorolac   Antibiotics   []  Cipro   []  Keflex    [] Levaquin   []  Bactrim DS®      [] Cefuroxime   Urination   []  Vesicare®   []  Flomax      Burning   []  Pyridium®   []  UribelTM      Nausea   []  Zofran®   []  Phenergan®      Miscellaneous   []            [] Prescriptions Written on Chart    [x] Prescriptions sent Electronically prior to surgery           Our office will call you tomorrow to schedule your first follow-up appointment.    Please contact [facility] at [phone] or go to the nearest Emergency Department / Urgent Care facility for any other medical questions or concerns.        What to expect after Aquablation of the Prostate  Immediately after surgery   You will wake up in recovery room with a catheter draining urine to a bag on your leg.   You will go home with the catheter in place and that will

## (undated) DEVICE — SYRINGE CATH TIP 50ML

## (undated) DEVICE — CUTTING LOOP, BIPOLAR, 24/26 FR.: Brand: N.A.

## (undated) DEVICE — SYRINGE MED 30ML STD CLR PLAS LUERLOCK TIP N CTRL DISP

## (undated) DEVICE — TRI-LUMEN FILTERED TUBE SET WITH ACTIVATED CHARCOAL FILTER: Brand: AIRSEAL

## (undated) DEVICE — SEAL INSTR CYSTO ADJ BX PRT SEAL FOR ACC UP TO 6FR

## (undated) DEVICE — SNAPSECURE 3-WAY FOLEY DEVICE: Brand: MEDLINE

## (undated) DEVICE — SEAL ENDOSCP INSTR 7FR BX SELF SEAL

## (undated) DEVICE — SYRINGE,TOOMEY,IRRIGATION,70CC,STERILE: Brand: MEDLINE

## (undated) DEVICE — GLOVE SURG SZ 8 L12IN FNGR THK79MIL GRN LTX FREE

## (undated) DEVICE — BAG PRESSURE INFUSION 3 W STOPCOCK 3000 CC PREMIERPRO DISP

## (undated) DEVICE — CATHETER URETH FOL 3W STR TIP DUFOUR 24 FRX50 ML SIL LF

## (undated) DEVICE — PAD,NON-ADHERENT,3X8,STERILE,LF,1/PK: Brand: MEDLINE

## (undated) DEVICE — [FOUR SPIKE IRRIGATOR SET,  NON-PYROGENIC FLUID PATH,  DO NOT USE IF PACKAGE IS DAMAGED]

## (undated) DEVICE — BAG URIN LEG DISPOZ-A-BG 19OZ -- W/18IN EXT TUBING

## (undated) DEVICE — CATH URETH FOL 2W MED 22FRX5 --

## (undated) DEVICE — DERMABOND SKIN ADH 0.7ML --

## (undated) DEVICE — PAD PT POS 36 IN SURGYPAD DISP

## (undated) DEVICE — LINER,SEMI-RIGID,3000CC,50EA/CS: Brand: MEDLINE

## (undated) DEVICE — COLUMN DRAPE

## (undated) DEVICE — DEVICE SECUREMENT 1/32IN POLYETH FOAM F ANCHR URIN CATH

## (undated) DEVICE — BAG  LEG  EX-TUBING  18IN  MEDIUM  20OZ

## (undated) DEVICE — CYSTO PACK: Brand: MEDLINE INDUSTRIES, INC.

## (undated) DEVICE — PACK DRAPE AQUA ABLATION

## (undated) DEVICE — DRAINBAG,ANTI-REFLUX TOWER,L/F,2000ML,LL: Brand: MEDLINE

## (undated) DEVICE — CATH URETH FOL 2W SH 22FRX5ML -- CONVERT TO ITEM 363075

## (undated) DEVICE — 24FR BIPOLAR COAG ELECTRODE, BALL: Brand: N.A.

## (undated) DEVICE — Device

## (undated) DEVICE — DEVICE SECUREMENT CATH MED ADH HYDROCOLLOID STRL CATHGRIP LF

## (undated) DEVICE — PLUG CATH TAPR GRP HNDL CAP

## (undated) DEVICE — DUAL LUMEN URETERAL CATHETER

## (undated) DEVICE — GARMENT,MEDLINE,DVT,INT,CALF,MED, GEN2: Brand: MEDLINE

## (undated) DEVICE — SOL IRRIGATION INJ NACL 0.9% 500ML BTL

## (undated) DEVICE — STERILE POLYISOPRENE POWDER-FREE SURGICAL GLOVES: Brand: PROTEXIS

## (undated) DEVICE — ARM DRAPE

## (undated) DEVICE — DRAPE XR C ARM 41X74IN LF --

## (undated) DEVICE — DEVICE SECUREMENT CATH LG ADH HYDROCOLLOID CATHGRIP LF

## (undated) DEVICE — SOLUTION IRRIG 3000ML 0.9% SOD CHL FLX CONT 0797208] ICU MEDICAL INC]

## (undated) DEVICE — BLADELESS OBTURATOR: Brand: WECK VISTA

## (undated) DEVICE — TIP COVER ACCESSORY

## (undated) DEVICE — SET ADMIN IV PMP 20GTT 117IN -- 2 NDLS INJ SITE

## (undated) DEVICE — MAYO STAND COVER: Brand: CONVERTORS

## (undated) DEVICE — 150 MICRON SINGLE-USE FIBER ASSEMBLY WITH FLAT TIP: Brand: OPTILITE

## (undated) DEVICE — SOLUTION IRRIG 3000ML H2O STRL BAG

## (undated) DEVICE — GUIDEWIRE UROLOGICAL STR 3 CM 0.038 INX150 CM DUAL-FLEX

## (undated) DEVICE — AIRSEAL 8 MM ACCESS PORT AND LOW PROFILE OBTURATOR WITH BLADELESS OPTICAL TIP, 120 MM LENGTH: Brand: AIRSEAL

## (undated) DEVICE — LAP CHOLE: Brand: MEDLINE INDUSTRIES, INC.

## (undated) DEVICE — 24/26FR BIPOLAR CUTTING LOOP: Brand: N.A.

## (undated) DEVICE — SOL IRR SOD CL 0.9% 3000ML --

## (undated) DEVICE — CATHETER URET L70CM OD6FR OPN END FLEXIMA

## (undated) DEVICE — Device: Brand: AQUABEAM HANDPIECE

## (undated) DEVICE — TUBING, SUCTION, 1/4" X 12', STRAIGHT: Brand: MEDLINE

## (undated) DEVICE — GEL,ULTRASOUND,BOTTLE,8.5-OZ,CLEAR: Brand: MEDLINE INDUSTRIES, INC.

## (undated) DEVICE — SOL IRR SOD CHL 0.9% TITAN XL CNTNR 3000ML

## (undated) DEVICE — GLOVE ORANGE PI 7 1/2   MSG9075

## (undated) DEVICE — STRAP,POSITIONING,KNEE/BODY,FOAM,4X60": Brand: MEDLINE

## (undated) DEVICE — POUCH DRNGE FLX BND INTEGR RAIL CLMP DISP EZ CTCH

## (undated) DEVICE — INSUFFLATION NEEDLE TO ESTABLISH PNEUMOPERITONEUM.: Brand: INSUFFLATION NEEDLE

## (undated) DEVICE — SUTURE DEV SZ 2-0 WND CLSR ABSRB GS-22 VLOC COVIDIEN VLOCM2145

## (undated) DEVICE — SEAL UNIV 5-8MM DISP BX/10 -- DA VINCI XI - SNGL USE

## (undated) DEVICE — STERILE LATEX POWDER-FREE SURGICAL GLOVESWITH NITRILE COATING: Brand: PROTEXIS

## (undated) DEVICE — VISUALIZATION SYSTEM: Brand: CLEARIFY